# Patient Record
Sex: MALE | Race: OTHER | NOT HISPANIC OR LATINO | Employment: OTHER | URBAN - METROPOLITAN AREA
[De-identification: names, ages, dates, MRNs, and addresses within clinical notes are randomized per-mention and may not be internally consistent; named-entity substitution may affect disease eponyms.]

---

## 2018-01-24 ENCOUNTER — OFFICE VISIT (OUTPATIENT)
Dept: FAMILY MEDICINE CLINIC | Facility: CLINIC | Age: 48
End: 2018-01-24

## 2018-01-24 VITALS
RESPIRATION RATE: 16 BRPM | BODY MASS INDEX: 25.91 KG/M2 | SYSTOLIC BLOOD PRESSURE: 94 MMHG | DIASTOLIC BLOOD PRESSURE: 68 MMHG | HEIGHT: 68 IN | HEART RATE: 96 BPM | TEMPERATURE: 98.2 F | OXYGEN SATURATION: 98 % | WEIGHT: 171 LBS

## 2018-01-24 DIAGNOSIS — J06.9 ACUTE URI: ICD-10-CM

## 2018-01-24 DIAGNOSIS — Z76.89 ENCOUNTER TO ESTABLISH CARE: Primary | ICD-10-CM

## 2018-01-24 DIAGNOSIS — R52 BODY ACHES: ICD-10-CM

## 2018-01-24 DIAGNOSIS — J02.9 SORE THROAT: ICD-10-CM

## 2018-01-24 LAB
EXPIRATION DATE: NORMAL
EXPIRATION DATE: NORMAL
FLUAV AG NPH QL: NORMAL
FLUBV AG NPH QL: NORMAL
INTERNAL CONTROL: NORMAL
INTERNAL CONTROL: NORMAL
Lab: NORMAL
Lab: NORMAL
S PYO AG THROAT QL: NEGATIVE

## 2018-01-24 PROCEDURE — 87804 INFLUENZA ASSAY W/OPTIC: CPT | Performed by: PHYSICIAN ASSISTANT

## 2018-01-24 PROCEDURE — 87880 STREP A ASSAY W/OPTIC: CPT | Performed by: PHYSICIAN ASSISTANT

## 2018-01-24 PROCEDURE — 99213 OFFICE O/P EST LOW 20 MIN: CPT | Performed by: PHYSICIAN ASSISTANT

## 2018-01-24 RX ORDER — AZITHROMYCIN 250 MG/1
TABLET, FILM COATED ORAL
Qty: 6 TABLET | Refills: 0 | Status: SHIPPED | OUTPATIENT
Start: 2018-01-24 | End: 2018-07-30

## 2018-01-24 RX ORDER — BROMPHENIRAMINE MALEATE, PSEUDOEPHEDRINE HYDROCHLORIDE, AND DEXTROMETHORPHAN HYDROBROMIDE 2; 30; 10 MG/5ML; MG/5ML; MG/5ML
5 SYRUP ORAL 4 TIMES DAILY PRN
Qty: 118 ML | Refills: 0 | Status: SHIPPED | OUTPATIENT
Start: 2018-01-24 | End: 2018-07-30

## 2018-01-24 RX ORDER — METHYLPREDNISOLONE 4 MG/1
TABLET ORAL
Qty: 21 EACH | Refills: 0 | Status: SHIPPED | OUTPATIENT
Start: 2018-01-24 | End: 2018-07-30

## 2018-01-24 NOTE — PROGRESS NOTES
Shauna Shultz is a 48 y.o. male.     History of Present Illness   Pt presents with CC of sore throat, HA, feeling feverish (did not take temperature), chills, nasal congestion, cough, and body aches X 4 days. Recently traveled here from [place]. Has been taking Tylenol. Feels a little better today     The following portions of the patient's history were reviewed and updated as appropriate: allergies, current medications, past family history, past medical history, past social history, past surgical history and problem list.    Review of Systems   Constitutional: Positive for chills, fatigue and fever. Negative for diaphoresis.   HENT: Positive for congestion, postnasal drip and sore throat. Negative for ear discharge, ear pain, hearing loss, nosebleeds, sinus pressure and sneezing.    Eyes: Negative.    Respiratory: Positive for cough. Negative for chest tightness, shortness of breath and wheezing.    Cardiovascular: Negative.  Negative for chest pain, palpitations and leg swelling.   Gastrointestinal: Negative for abdominal distention, abdominal pain, anal bleeding, blood in stool, constipation, diarrhea, nausea, rectal pain and vomiting.   Endocrine: Negative.  Negative for cold intolerance, heat intolerance, polydipsia, polyphagia and polyuria.   Genitourinary: Negative.  Negative for difficulty urinating, dysuria, flank pain, frequency, hematuria and urgency.   Musculoskeletal: Positive for myalgias. Negative for arthralgias, back pain, gait problem, joint swelling, neck pain and neck stiffness.   Skin: Negative.  Negative for color change, pallor, rash and wound.   Allergic/Immunologic: Negative.  Negative for immunocompromised state.   Neurological: Positive for headaches. Negative for dizziness, syncope, weakness, light-headedness and numbness.   Hematological: Negative.  Negative for adenopathy. Does not bruise/bleed easily.   Psychiatric/Behavioral: Negative.  Negative for behavioral problems,  "confusion, self-injury, sleep disturbance and suicidal ideas. The patient is not nervous/anxious.        Objective    Blood pressure 94/68, pulse 96, temperature 98.2 °F (36.8 °C), temperature source Temporal Artery , resp. rate 16, height 172.7 cm (68\"), weight 77.6 kg (171 lb), SpO2 98 %.     Physical Exam   Constitutional: He is oriented to person, place, and time. He appears well-developed and well-nourished.   HENT:   Head: Normocephalic and atraumatic.   Right Ear: External ear and ear canal normal. Tympanic membrane is retracted. Tympanic membrane is not erythematous and not bulging.   Left Ear: External ear and ear canal normal. Tympanic membrane is retracted. Tympanic membrane is not erythematous and not bulging.   Nose: Mucosal edema and rhinorrhea present. Right sinus exhibits no maxillary sinus tenderness and no frontal sinus tenderness. Left sinus exhibits no maxillary sinus tenderness and no frontal sinus tenderness.   Mouth/Throat: Oropharynx is clear and moist and mucous membranes are normal. No oropharyngeal exudate, posterior oropharyngeal edema or posterior oropharyngeal erythema.   Eyes: Conjunctivae and EOM are normal. Pupils are equal, round, and reactive to light.   Neck: Normal range of motion. Neck supple. No tracheal deviation present. No thyromegaly present.   Cardiovascular: Normal rate, regular rhythm, normal heart sounds and intact distal pulses.  Exam reveals no gallop and no friction rub.    No murmur heard.  Pulmonary/Chest: Effort normal and breath sounds normal. No respiratory distress. He has no wheezes. He has no rales. He exhibits no tenderness.   Abdominal: Soft. Bowel sounds are normal. He exhibits no distension and no mass. There is no tenderness. There is no rebound and no guarding. No hernia.   Lymphadenopathy:     He has no cervical adenopathy.   Neurological: He is alert and oriented to person, place, and time.   Skin: Skin is warm and dry.   Psychiatric: He has a normal " mood and affect. His behavior is normal. Judgment and thought content normal.   Nursing note and vitals reviewed.      Assessment/Plan   Dragan was seen today for sore throat, fever, generalized body aches, nasal congestion, headache and labs only.    Diagnoses and all orders for this visit:    Encounter to establish care  -     POCT rapid strep A    Body aches  -     POC Influenza A / B  -     POCT rapid strep A    Sore throat  -     POCT rapid strep A    Acute URI  -     azithromycin (ZITHROMAX Z-MARY ELLEN) 250 MG tablet; Take 2 tablets the first day, then 1 tablet daily for 4 days.  -     MethylPREDNISolone (MEDROL, MARY ELLEN,) 4 MG tablet; Take as directed on package instructions.  -     brompheniramine-pseudoephedrine-DM 30-2-10 MG/5ML syrup; Take 5 mL by mouth 4 (Four) Times a Day As Needed for Congestion or Cough.      Flu and strep negative. Symptoms likely viral. Continue symptomatic treatment as discusses. May begin abx if no improvement or worsening in next 2-3 days. Pt agrees. F/U INB

## 2018-07-30 ENCOUNTER — OFFICE VISIT (OUTPATIENT)
Dept: FAMILY MEDICINE CLINIC | Facility: CLINIC | Age: 48
End: 2018-07-30

## 2018-07-30 VITALS
HEART RATE: 92 BPM | WEIGHT: 171.5 LBS | DIASTOLIC BLOOD PRESSURE: 70 MMHG | BODY MASS INDEX: 25.99 KG/M2 | SYSTOLIC BLOOD PRESSURE: 105 MMHG | RESPIRATION RATE: 16 BRPM | HEIGHT: 68 IN | TEMPERATURE: 98.1 F

## 2018-07-30 DIAGNOSIS — M25.562 PAIN IN BOTH KNEES, UNSPECIFIED CHRONICITY: ICD-10-CM

## 2018-07-30 DIAGNOSIS — M25.561 PAIN IN BOTH KNEES, UNSPECIFIED CHRONICITY: ICD-10-CM

## 2018-07-30 DIAGNOSIS — R73.03 PRE-DIABETES: ICD-10-CM

## 2018-07-30 DIAGNOSIS — E55.9 VITAMIN D DEFICIENCY: ICD-10-CM

## 2018-07-30 DIAGNOSIS — R07.89 OTHER CHEST PAIN: ICD-10-CM

## 2018-07-30 DIAGNOSIS — R09.81 SINUS CONGESTION: Primary | ICD-10-CM

## 2018-07-30 DIAGNOSIS — E78.5 HYPERLIPIDEMIA, UNSPECIFIED HYPERLIPIDEMIA TYPE: ICD-10-CM

## 2018-07-30 PROCEDURE — 99214 OFFICE O/P EST MOD 30 MIN: CPT | Performed by: PHYSICIAN ASSISTANT

## 2018-07-30 RX ORDER — LORATADINE 10 MG/1
10 TABLET ORAL DAILY
Qty: 30 TABLET | Refills: 3 | Status: SHIPPED | OUTPATIENT
Start: 2018-07-30 | End: 2018-12-17

## 2018-07-30 RX ORDER — BROMPHENIRAMINE MALEATE, PSEUDOEPHEDRINE HYDROCHLORIDE, AND DEXTROMETHORPHAN HYDROBROMIDE 2; 30; 10 MG/5ML; MG/5ML; MG/5ML
5 SYRUP ORAL 4 TIMES DAILY PRN
Qty: 118 ML | Refills: 2 | Status: SHIPPED | OUTPATIENT
Start: 2018-07-30 | End: 2018-12-17

## 2018-07-30 RX ORDER — FLUTICASONE PROPIONATE 50 MCG
2 SPRAY, SUSPENSION (ML) NASAL DAILY
Qty: 1 BOTTLE | Refills: 3 | Status: SHIPPED | OUTPATIENT
Start: 2018-07-30 | End: 2018-12-17

## 2018-07-30 NOTE — PROGRESS NOTES
Subjective   Dragan Shultz is a 48 y.o. male.     History of Present Illness   Pt presents for f/u. Requests lab work. Has been told in [place] that his blood sugar and cholesterol have been elevated in the past.   Been two years since last stress test. Everything normal at that time, but requests to have it checked again. Does have occasional left sided chest pain   Since moving to [place] in the spring, had a lot of sinus congestion, drainage and cough. Better now however   Bilateral knee pain if he has been on them all day. L>R. Family tries to tell him to rest. Works in [employer] industry so has to get up during the night. Legs will be painful until he stands on them for awhile. No injury or crepitus.     The following portions of the patient's history were reviewed and updated as appropriate: allergies, current medications, past family history, past medical history, past social history, past surgical history and problem list.    Review of Systems   Constitutional: Negative.  Negative for chills, diaphoresis, fatigue and fever.   HENT: Positive for congestion. Negative for ear discharge, ear pain, hearing loss, nosebleeds, postnasal drip, sinus pressure, sneezing and sore throat.    Eyes: Negative.    Respiratory: Negative.  Negative for cough, chest tightness, shortness of breath and wheezing.    Cardiovascular: Negative for palpitations and leg swelling.        Refer to HPI    Gastrointestinal: Negative for abdominal distention, abdominal pain, anal bleeding, blood in stool, constipation, diarrhea, nausea, rectal pain and vomiting.   Endocrine: Negative.  Negative for cold intolerance, heat intolerance, polydipsia, polyphagia and polyuria.   Genitourinary: Negative.  Negative for difficulty urinating, dysuria, flank pain, frequency, hematuria and urgency.   Musculoskeletal: Positive for arthralgias. Negative for back pain, gait problem, joint swelling, myalgias, neck pain and neck stiffness.   Skin: Negative.   Negative for color change, pallor, rash and wound.   Allergic/Immunologic: Positive for environmental allergies. Negative for immunocompromised state.   Neurological: Negative for dizziness, syncope, weakness, light-headedness, numbness and headaches.   Hematological: Negative.  Negative for adenopathy. Does not bruise/bleed easily.   Psychiatric/Behavioral: Negative.  Negative for behavioral problems, confusion, self-injury, sleep disturbance and suicidal ideas. The patient is not nervous/anxious.        Objective   Physical Exam   Constitutional: He is oriented to person, place, and time. He appears well-developed and well-nourished.   HENT:   Head: Normocephalic and atraumatic.   Right Ear: Tympanic membrane, external ear and ear canal normal.   Left Ear: Tympanic membrane, external ear and ear canal normal.   Nose: Nose normal.   Mouth/Throat: Oropharynx is clear and moist. No oropharyngeal exudate.   Eyes: Pupils are equal, round, and reactive to light. Conjunctivae and EOM are normal.   Neck: Normal range of motion. Neck supple. No tracheal deviation present. No thyromegaly present.   Cardiovascular: Normal rate, regular rhythm, normal heart sounds and intact distal pulses.  Exam reveals no gallop and no friction rub.    No murmur heard.  Pulmonary/Chest: Effort normal and breath sounds normal. No respiratory distress. He has no wheezes. He has no rales. He exhibits no tenderness.   Abdominal: Soft. Bowel sounds are normal. He exhibits no distension and no mass. There is no tenderness. There is no rebound and no guarding. No hernia.   Musculoskeletal: Normal range of motion. He exhibits no edema, tenderness or deformity.   Lymphadenopathy:     He has no cervical adenopathy.   Neurological: He is alert and oriented to person, place, and time. He has normal reflexes.   Skin: Skin is warm and dry.   Psychiatric: He has a normal mood and affect. His behavior is normal. Judgment and thought content normal.    Nursing note and vitals reviewed.      Assessment/Plan   Dragan was seen today for pt desires lab work.    Diagnoses and all orders for this visit:    Sinus congestion  -     fluticasone (FLONASE) 50 MCG/ACT nasal spray; 2 sprays into the nostril(s) as directed by provider Daily.  -     loratadine (CLARITIN) 10 MG tablet; Take 1 tablet by mouth Daily.  -     brompheniramine-pseudoephedrine-DM 30-2-10 MG/5ML syrup; Take 5 mL by mouth 4 (Four) Times a Day As Needed for Congestion, Cough or Allergies.    Pain in both knees, unspecified chronicity  -     Sedimentation rate, automated  -     C-reactive protein  -     Rheumatoid Factor, Quant    Hyperlipidemia, unspecified hyperlipidemia type  -     CBC w AUTO Differential  -     Comprehensive metabolic panel  -     Lipid panel    Pre-diabetes  -     CBC w AUTO Differential  -     Comprehensive metabolic panel  -     Hemoglobin A1c    Vitamin D deficiency  -     Vitamin D 25 hydroxy    Other chest pain  -     Ambulatory Referral to Cardiology    labs as outlined in plan. Referral to cardiology for requested stress testing. No current symptoms   Allergy and congestion medication for when symptoms act up   F/u pending labs.

## 2018-07-31 LAB
25(OH)D3+25(OH)D2 SERPL-MCNC: 31.5 NG/ML
ALBUMIN SERPL-MCNC: 4.45 G/DL (ref 3.2–4.8)
ALBUMIN/GLOB SERPL: 1.7 G/DL (ref 1.5–2.5)
ALP SERPL-CCNC: 49 U/L (ref 25–100)
ALT SERPL-CCNC: 19 U/L (ref 7–40)
AST SERPL-CCNC: 15 U/L (ref 0–33)
BASOPHILS # BLD AUTO: 0.02 10*3/MM3 (ref 0–0.2)
BASOPHILS NFR BLD AUTO: 0.4 % (ref 0–1)
BILIRUB SERPL-MCNC: 0.5 MG/DL (ref 0.3–1.2)
BUN SERPL-MCNC: 10 MG/DL (ref 9–23)
BUN/CREAT SERPL: 10.9 (ref 7–25)
CALCIUM SERPL-MCNC: 9.5 MG/DL (ref 8.7–10.4)
CHLORIDE SERPL-SCNC: 107 MMOL/L (ref 99–109)
CHOLEST SERPL-MCNC: 254 MG/DL (ref 0–200)
CO2 SERPL-SCNC: 28 MMOL/L (ref 20–31)
CREAT SERPL-MCNC: 0.92 MG/DL (ref 0.6–1.3)
CRP SERPL-MCNC: 0.22 MG/DL (ref 0–1)
EOSINOPHIL # BLD AUTO: 0.27 10*3/MM3 (ref 0–0.3)
EOSINOPHIL NFR BLD AUTO: 5.5 % (ref 0–3)
ERYTHROCYTE [DISTWIDTH] IN BLOOD BY AUTOMATED COUNT: 14.5 % (ref 11.3–14.5)
ERYTHROCYTE [SEDIMENTATION RATE] IN BLOOD BY WESTERGREN METHOD: 4 MM/HR (ref 0–15)
GLOBULIN SER CALC-MCNC: 2.7 GM/DL
GLUCOSE SERPL-MCNC: 94 MG/DL (ref 70–100)
HBA1C MFR BLD: 6.4 % (ref 4.8–5.6)
HCT VFR BLD AUTO: 49 % (ref 38.9–50.9)
HDLC SERPL-MCNC: 45 MG/DL (ref 40–60)
HGB BLD-MCNC: 15.2 G/DL (ref 13.1–17.5)
IMM GRANULOCYTES # BLD: 0.04 10*3/MM3 (ref 0–0.03)
IMM GRANULOCYTES NFR BLD: 0.8 % (ref 0–0.6)
LDLC SERPL CALC-MCNC: 151 MG/DL (ref 0–100)
LYMPHOCYTES # BLD AUTO: 2.23 10*3/MM3 (ref 0.6–4.8)
LYMPHOCYTES NFR BLD AUTO: 45.1 % (ref 24–44)
MCH RBC QN AUTO: 27.7 PG (ref 27–31)
MCHC RBC AUTO-ENTMCNC: 31 G/DL (ref 32–36)
MCV RBC AUTO: 89.4 FL (ref 80–99)
MONOCYTES # BLD AUTO: 0.51 10*3/MM3 (ref 0–1)
MONOCYTES NFR BLD AUTO: 10.3 % (ref 0–12)
NEUTROPHILS # BLD AUTO: 1.88 10*3/MM3 (ref 1.5–8.3)
NEUTROPHILS NFR BLD AUTO: 37.9 % (ref 41–71)
PLATELET # BLD AUTO: 167 10*3/MM3 (ref 150–450)
POTASSIUM SERPL-SCNC: 4.7 MMOL/L (ref 3.5–5.5)
PROT SERPL-MCNC: 7.1 G/DL (ref 5.7–8.2)
RBC # BLD AUTO: 5.48 10*6/MM3 (ref 4.2–5.76)
RHEUMATOID FACT SERPL-ACNC: <10 IU/ML (ref 0–13.9)
SODIUM SERPL-SCNC: 144 MMOL/L (ref 132–146)
TRIGL SERPL-MCNC: 290 MG/DL (ref 0–150)
VLDLC SERPL CALC-MCNC: 58 MG/DL
WBC # BLD AUTO: 4.95 10*3/MM3 (ref 3.5–10.8)

## 2018-12-17 ENCOUNTER — OFFICE VISIT (OUTPATIENT)
Dept: FAMILY MEDICINE CLINIC | Facility: CLINIC | Age: 48
End: 2018-12-17

## 2018-12-17 ENCOUNTER — HOSPITAL ENCOUNTER (OUTPATIENT)
Dept: GENERAL RADIOLOGY | Facility: HOSPITAL | Age: 48
Discharge: HOME | End: 2018-12-17
Admitting: PHYSICIAN ASSISTANT

## 2018-12-17 VITALS
WEIGHT: 164 LBS | BODY MASS INDEX: 24.94 KG/M2 | RESPIRATION RATE: 18 BRPM | HEART RATE: 68 BPM | DIASTOLIC BLOOD PRESSURE: 78 MMHG | SYSTOLIC BLOOD PRESSURE: 112 MMHG | TEMPERATURE: 97.2 F

## 2018-12-17 DIAGNOSIS — R05.9 COUGH: ICD-10-CM

## 2018-12-17 DIAGNOSIS — R73.9 ELEVATED BLOOD SUGAR: Primary | ICD-10-CM

## 2018-12-17 DIAGNOSIS — E78.00 ELEVATED CHOLESTEROL: ICD-10-CM

## 2018-12-17 PROCEDURE — 71046 X-RAY EXAM CHEST 2 VIEWS: CPT

## 2018-12-17 PROCEDURE — 99213 OFFICE O/P EST LOW 20 MIN: CPT | Performed by: PHYSICIAN ASSISTANT

## 2018-12-17 RX ORDER — MONTELUKAST SODIUM 10 MG/1
10 TABLET ORAL NIGHTLY
Qty: 30 TABLET | Refills: 5 | Status: SHIPPED | OUTPATIENT
Start: 2018-12-17 | End: 2019-05-30

## 2018-12-17 NOTE — PROGRESS NOTES
Subjective   Dragan Shultz is a 48 y.o. male.     History of Present Illness   F/u for elevated blood sugar and cholesterol. Fasting for labs.   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 Some mucus production. Allergy medication did not help. Denies heartburn or reflux. Hard for him to get to sleep due to cough. Denies snoring   Did not do cardiology referral. Denies any additional chest pain since last visit     The following portions of the patient's history were reviewed and updated as appropriate: allergies, current medications, past family history, past medical history, past social history, past surgical history and problem list.    Review of Systems   Constitutional: Negative.  Negative for chills, diaphoresis, fatigue and fever.   HENT: Negative.  Negative for congestion, ear discharge, ear pain, hearing loss, nosebleeds, postnasal drip, sinus pressure, sneezing and sore throat.    Eyes: Negative.    Respiratory: Positive for cough. Negative for chest tightness, shortness of breath and wheezing.    Cardiovascular: Negative.  Negative for chest pain, palpitations and leg swelling.   Gastrointestinal: Negative for abdominal distention, abdominal pain, anal bleeding, blood in stool, constipation, diarrhea, nausea, rectal pain and vomiting.   Endocrine: Negative.  Negative for cold intolerance, heat intolerance, polydipsia, polyphagia and polyuria.   Genitourinary: Negative.  Negative for difficulty urinating, dysuria, flank pain, frequency, hematuria and urgency.   Musculoskeletal: Negative.  Negative for arthralgias, back pain, gait problem, joint swelling, myalgias, neck pain and neck stiffness.   Skin: Negative.  Negative for color change, pallor, rash and wound.   Allergic/Immunologic: Negative.  Negative for immunocompromised state.   Neurological: Negative for  dizziness, syncope, weakness, light-headedness, numbness and headaches.   Hematological: Negative.  Negative for adenopathy. Does not bruise/bleed easily.   Psychiatric/Behavioral: Negative.  Negative for behavioral problems, confusion, self-injury, sleep disturbance and suicidal ideas. The patient is not nervous/anxious.        Objective    Blood pressure 112/78, pulse 68, temperature 97.2 °F (36.2 °C), temperature source Temporal, resp. rate 18, weight 74.4 kg (164 lb).     Physical Exam   Constitutional: He is oriented to person, place, and time. He appears well-developed and well-nourished.   HENT:   Head: Normocephalic and atraumatic.   Right Ear: External ear normal.   Left Ear: External ear normal.   Nose: Nose normal.   Mouth/Throat: Oropharynx is clear and moist. No oropharyngeal exudate.   Eyes: Conjunctivae and EOM are normal. Pupils are equal, round, and reactive to light.   Neck: Normal range of motion. Neck supple. No tracheal deviation present. No thyromegaly present.   Cardiovascular: Normal rate, regular rhythm, normal heart sounds and intact distal pulses. Exam reveals no gallop and no friction rub.   No murmur heard.  Pulmonary/Chest: Effort normal and breath sounds normal. No respiratory distress. He has no wheezes. He has no rales. He exhibits no tenderness.   Abdominal: Soft. Bowel sounds are normal. He exhibits no distension and no mass. There is no tenderness. There is no rebound and no guarding. No hernia.   Lymphadenopathy:     He has no cervical adenopathy.   Neurological: He is alert and oriented to person, place, and time. He has normal reflexes.   Skin: Skin is warm and dry.   Psychiatric: He has a normal mood and affect. His behavior is normal. Judgment and thought content normal.   Nursing note and vitals reviewed.      Assessment/Plan   Dragan was seen today for follow-up.    Diagnoses and all orders for this visit:    Elevated blood sugar  -     CBC & Differential  -      Comprehensive Metabolic Panel  -     Hemoglobin A1c    Elevated cholesterol  -     Lipid Panel    Cough  -     XR Chest PA & Lateral  -     montelukast (SINGULAIR) 10 MG tablet; Take 1 tablet by mouth Every Night.    labs as outlined in plan. Will do chest XR for persistent cough and do trial of singulair as noted

## 2018-12-18 LAB
ALBUMIN SERPL-MCNC: 4.49 G/DL (ref 3.2–4.8)
ALBUMIN/GLOB SERPL: 1.9 G/DL (ref 1.5–2.5)
ALP SERPL-CCNC: 48 U/L (ref 25–100)
ALT SERPL-CCNC: 25 U/L (ref 7–40)
AST SERPL-CCNC: 17 U/L (ref 0–33)
BASOPHILS # BLD AUTO: 0.02 10*3/MM3 (ref 0–0.2)
BASOPHILS NFR BLD AUTO: 0.4 % (ref 0–1)
BILIRUB SERPL-MCNC: 0.6 MG/DL (ref 0.3–1.2)
BUN SERPL-MCNC: 12 MG/DL (ref 9–23)
BUN/CREAT SERPL: 12.5 (ref 7–25)
CALCIUM SERPL-MCNC: 9.3 MG/DL (ref 8.7–10.4)
CHLORIDE SERPL-SCNC: 106 MMOL/L (ref 99–109)
CHOLEST SERPL-MCNC: 264 MG/DL (ref 0–200)
CO2 SERPL-SCNC: 26 MMOL/L (ref 20–31)
CREAT SERPL-MCNC: 0.96 MG/DL (ref 0.6–1.3)
EOSINOPHIL # BLD AUTO: 0.43 10*3/MM3 (ref 0–0.3)
EOSINOPHIL NFR BLD AUTO: 8.7 % (ref 0–3)
ERYTHROCYTE [DISTWIDTH] IN BLOOD BY AUTOMATED COUNT: 14.4 % (ref 11.3–14.5)
GLOBULIN SER CALC-MCNC: 2.3 GM/DL
GLUCOSE SERPL-MCNC: 109 MG/DL (ref 70–100)
HBA1C MFR BLD: 6.2 % (ref 4.8–5.6)
HCT VFR BLD AUTO: 48.7 % (ref 38.9–50.9)
HDLC SERPL-MCNC: 49 MG/DL (ref 40–60)
HGB BLD-MCNC: 15.4 G/DL (ref 13.1–17.5)
IMM GRANULOCYTES # BLD: 0.02 10*3/MM3 (ref 0–0.03)
IMM GRANULOCYTES NFR BLD: 0.4 % (ref 0–0.6)
LDLC SERPL CALC-MCNC: 176 MG/DL (ref 0–100)
LYMPHOCYTES # BLD AUTO: 2.36 10*3/MM3 (ref 0.6–4.8)
LYMPHOCYTES NFR BLD AUTO: 47.6 % (ref 24–44)
MCH RBC QN AUTO: 27.6 PG (ref 27–31)
MCHC RBC AUTO-ENTMCNC: 31.6 G/DL (ref 32–36)
MCV RBC AUTO: 87.3 FL (ref 80–99)
MONOCYTES # BLD AUTO: 0.45 10*3/MM3 (ref 0–1)
MONOCYTES NFR BLD AUTO: 9.1 % (ref 0–12)
NEUTROPHILS # BLD AUTO: 1.68 10*3/MM3 (ref 1.5–8.3)
NEUTROPHILS NFR BLD AUTO: 33.8 % (ref 41–71)
PLATELET # BLD AUTO: 199 10*3/MM3 (ref 150–450)
POTASSIUM SERPL-SCNC: 5 MMOL/L (ref 3.5–5.5)
PROT SERPL-MCNC: 6.8 G/DL (ref 5.7–8.2)
RBC # BLD AUTO: 5.58 10*6/MM3 (ref 4.2–5.76)
SODIUM SERPL-SCNC: 139 MMOL/L (ref 132–146)
TRIGL SERPL-MCNC: 197 MG/DL (ref 0–150)
VLDLC SERPL CALC-MCNC: 39.4 MG/DL
WBC # BLD AUTO: 4.96 10*3/MM3 (ref 3.5–10.8)

## 2019-04-15 ENCOUNTER — TELEPHONE (OUTPATIENT)
Dept: FAMILY MEDICINE CLINIC | Facility: CLINIC | Age: 49
End: 2019-04-15

## 2019-04-15 NOTE — TELEPHONE ENCOUNTER
----- Message from Valentine Hoyos sent at 4/15/2019  8:07 AM EDT -----  Contact: MAURICIO;SON CALLED-RADHA  IS THE PT DUE LABS WORK AT THIS TIME?     [phone]

## 2019-04-16 ENCOUNTER — OFFICE VISIT (OUTPATIENT)
Dept: FAMILY MEDICINE CLINIC | Facility: CLINIC | Age: 49
End: 2019-04-16

## 2019-04-16 ENCOUNTER — HOSPITAL ENCOUNTER (OUTPATIENT)
Dept: GENERAL RADIOLOGY | Facility: HOSPITAL | Age: 49
Discharge: HOME | End: 2019-04-16
Admitting: PHYSICIAN ASSISTANT

## 2019-04-16 VITALS
RESPIRATION RATE: 18 BRPM | BODY MASS INDEX: 26.07 KG/M2 | SYSTOLIC BLOOD PRESSURE: 108 MMHG | WEIGHT: 172 LBS | HEART RATE: 84 BPM | TEMPERATURE: 97.6 F | DIASTOLIC BLOOD PRESSURE: 74 MMHG | HEIGHT: 68 IN

## 2019-04-16 DIAGNOSIS — E55.9 VITAMIN D DEFICIENCY: ICD-10-CM

## 2019-04-16 DIAGNOSIS — R53.82 CHRONIC FATIGUE: ICD-10-CM

## 2019-04-16 DIAGNOSIS — R73.03 PRE-DIABETES: Primary | ICD-10-CM

## 2019-04-16 DIAGNOSIS — M25.531 RIGHT WRIST PAIN: ICD-10-CM

## 2019-04-16 DIAGNOSIS — M25.531 ARTHRALGIA OF RIGHT WRIST: ICD-10-CM

## 2019-04-16 DIAGNOSIS — E53.8 VITAMIN B12 DEFICIENCY: ICD-10-CM

## 2019-04-16 PROCEDURE — 73110 X-RAY EXAM OF WRIST: CPT

## 2019-04-16 PROCEDURE — 99214 OFFICE O/P EST MOD 30 MIN: CPT | Performed by: PHYSICIAN ASSISTANT

## 2019-04-16 RX ORDER — MELOXICAM 7.5 MG/1
7.5-15 TABLET ORAL DAILY
Qty: 30 TABLET | Refills: 0 | Status: SHIPPED | OUTPATIENT
Start: 2019-04-16 | End: 2019-05-30

## 2019-04-16 NOTE — PROGRESS NOTES
"Shauna Shultz is a 49 y.o. male.     History of Present Illness   F/U for elevated blood sugar. Due for labs. 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 Has in knees (chronic). Also has been having R wrist pain (dorsal) off and on for over a year. Usually worse by driving position, or repetitive motions such as twisting/ turning, bending. No elbow or forearm pain. No trauma to hand. No numbness or tingling. No pain in fingers. No swelling   Pt is right hand dominant     The following portions of the patient's history were reviewed and updated as appropriate: allergies, current medications, past family history, past medical history, past social history, past surgical history and problem list.    Review of Systems   Constitutional: Positive for fatigue. Negative for chills, diaphoresis and fever.   HENT: Negative.  Negative for congestion, ear discharge, ear pain, hearing loss, nosebleeds, postnasal drip, sinus pressure, sneezing and sore throat.    Eyes: Negative.    Respiratory: Negative.  Negative for cough, chest tightness, shortness of breath and wheezing.    Cardiovascular: Negative.  Negative for chest pain, palpitations and leg swelling.   Gastrointestinal: Negative for abdominal distention, abdominal pain, blood in stool, constipation, diarrhea, nausea and vomiting.   Genitourinary: Negative.  Negative for difficulty urinating, dysuria, flank pain, frequency, hematuria and urgency.   Musculoskeletal: Positive for arthralgias. Negative for back pain, gait problem, joint swelling, myalgias, neck pain and neck stiffness.   Skin: Negative.  Negative for color change, pallor, rash and wound.   Neurological: Negative for dizziness, syncope, weakness, light-headedness, numbness and headaches.       Objective    Blood pressure 108/74, pulse 84, temperature 97.6 °F (36.4 °C), resp. rate 18, height 172.7 cm (68\"), weight 78 " kg (172 lb).     Physical Exam   Constitutional: He is oriented to person, place, and time. He appears well-developed and well-nourished.   HENT:   Head: Normocephalic and atraumatic.   Right Ear: External ear normal.   Left Ear: External ear normal.   Nose: Nose normal.   Mouth/Throat: Oropharynx is clear and moist. No oropharyngeal exudate.   Eyes: Conjunctivae are normal.   Neck: Normal range of motion. Neck supple. No tracheal deviation present. No thyromegaly present.   Cardiovascular: Normal rate, regular rhythm, normal heart sounds and intact distal pulses. Exam reveals no gallop and no friction rub.   No murmur heard.  Pulmonary/Chest: Effort normal and breath sounds normal. No respiratory distress. He has no wheezes. He has no rales. He exhibits no tenderness.   Abdominal: Soft. Bowel sounds are normal. He exhibits no distension and no mass. There is no tenderness. There is no rebound and no guarding. No hernia.   Musculoskeletal: Normal range of motion. He exhibits no edema or deformity.        Right wrist: He exhibits tenderness and bony tenderness. He exhibits normal range of motion, no swelling, no effusion, no crepitus, no deformity and no laceration.   Lymphadenopathy:     He has no cervical adenopathy.   Neurological: He is alert and oriented to person, place, and time.   Skin: Skin is warm and dry.   Psychiatric: He has a normal mood and affect. His behavior is normal. Judgment and thought content normal.   Nursing note and vitals reviewed.      Assessment/Plan   Dragan was seen today for wrist pain.    Diagnoses and all orders for this visit:    Pre-diabetes  -     CBC & Differential  -     Comprehensive Metabolic Panel  -     Lipid Panel  -     Hemoglobin A1c    Right wrist pain  -     XR Wrist 3+ View Right  -     meloxicam (MOBIC) 7.5 MG tablet; Take 1-2 tablets by mouth Daily.    Vitamin D deficiency  -     Vitamin D 25 Hydroxy    Vitamin B12 deficiency  -     Vitamin B12    Chronic  fatigue  -     Iron Profile    Arthralgia of right wrist  -     Uric acid  -     RHEUMATOID FACTOR  -     C-reactive Protein  -     Sedimentation rate, automated    labs as outlined in plan. Will proceed with XR of wrist. Suspect tendonitis. NSAID and rest as directed. Discussed brace can also help to allow restricted movement and rest.

## 2019-04-17 LAB
25(OH)D3+25(OH)D2 SERPL-MCNC: 20.7 NG/ML (ref 30–100)
ALBUMIN SERPL-MCNC: 4.8 G/DL (ref 3.5–5.2)
ALBUMIN/GLOB SERPL: 1.7 G/DL
ALP SERPL-CCNC: 55 U/L (ref 39–117)
ALT SERPL-CCNC: 19 U/L (ref 1–41)
AST SERPL-CCNC: 15 U/L (ref 1–40)
BASOPHILS # BLD AUTO: 0.06 10*3/MM3 (ref 0–0.2)
BASOPHILS NFR BLD AUTO: 1.1 % (ref 0–1.5)
BILIRUB SERPL-MCNC: 0.4 MG/DL (ref 0.2–1.2)
BUN SERPL-MCNC: 8 MG/DL (ref 6–20)
BUN/CREAT SERPL: 8.8 (ref 7–25)
CALCIUM SERPL-MCNC: 9.8 MG/DL (ref 8.6–10.5)
CHLORIDE SERPL-SCNC: 103 MMOL/L (ref 98–107)
CHOLEST SERPL-MCNC: 284 MG/DL (ref 0–200)
CO2 SERPL-SCNC: 28 MMOL/L (ref 22–29)
CREAT SERPL-MCNC: 0.91 MG/DL (ref 0.76–1.27)
CRP SERPL-MCNC: 0.16 MG/DL (ref 0–0.5)
EOSINOPHIL # BLD AUTO: 0.63 10*3/MM3 (ref 0–0.4)
EOSINOPHIL NFR BLD AUTO: 11.3 % (ref 0.3–6.2)
ERYTHROCYTE [DISTWIDTH] IN BLOOD BY AUTOMATED COUNT: 14.5 % (ref 12.3–15.4)
ERYTHROCYTE [SEDIMENTATION RATE] IN BLOOD BY WESTERGREN METHOD: 1 MM/HR (ref 0–15)
GLOBULIN SER CALC-MCNC: 2.8 GM/DL
GLUCOSE SERPL-MCNC: 122 MG/DL (ref 65–99)
HBA1C MFR BLD: 6.17 % (ref 4.8–5.6)
HCT VFR BLD AUTO: 50.7 % (ref 37.5–51)
HDLC SERPL-MCNC: 42 MG/DL (ref 40–60)
HGB BLD-MCNC: 15.5 G/DL (ref 13–17.7)
IMM GRANULOCYTES # BLD AUTO: 0.04 10*3/MM3 (ref 0–0.05)
IMM GRANULOCYTES NFR BLD AUTO: 0.7 % (ref 0–0.5)
IRON SATN MFR SERPL: 34 % (ref 20–50)
IRON SERPL-MCNC: 120 MCG/DL (ref 59–158)
LDLC SERPL CALC-MCNC: 176 MG/DL (ref 0–100)
LYMPHOCYTES # BLD AUTO: 2.22 10*3/MM3 (ref 0.7–3.1)
LYMPHOCYTES NFR BLD AUTO: 39.7 % (ref 19.6–45.3)
MCH RBC QN AUTO: 27.5 PG (ref 26.6–33)
MCHC RBC AUTO-ENTMCNC: 30.6 G/DL (ref 31.5–35.7)
MCV RBC AUTO: 90.1 FL (ref 79–97)
MONOCYTES # BLD AUTO: 0.57 10*3/MM3 (ref 0.1–0.9)
MONOCYTES NFR BLD AUTO: 10.2 % (ref 5–12)
NEUTROPHILS # BLD AUTO: 2.07 10*3/MM3 (ref 1.4–7)
NEUTROPHILS NFR BLD AUTO: 37 % (ref 42.7–76)
NRBC BLD AUTO-RTO: 0 /100 WBC (ref 0–0)
PLATELET # BLD AUTO: 181 10*3/MM3 (ref 140–450)
POTASSIUM SERPL-SCNC: 4.4 MMOL/L (ref 3.5–5.2)
PROT SERPL-MCNC: 7.6 G/DL (ref 6–8.5)
RBC # BLD AUTO: 5.63 10*6/MM3 (ref 4.14–5.8)
RHEUMATOID FACT SERPL-ACNC: <10 IU/ML (ref 0–13.9)
SODIUM SERPL-SCNC: 140 MMOL/L (ref 136–145)
TIBC SERPL-MCNC: 355 MCG/DL
TRIGL SERPL-MCNC: 332 MG/DL (ref 0–150)
UIBC SERPL-MCNC: 235 MCG/DL
URATE SERPL-MCNC: 6.9 MG/DL (ref 3.4–7)
VIT B12 SERPL-MCNC: 234 PG/ML (ref 211–946)
VLDLC SERPL CALC-MCNC: 66.4 MG/DL
WBC # BLD AUTO: 5.59 10*3/MM3 (ref 3.4–10.8)

## 2019-04-24 ENCOUNTER — TELEPHONE (OUTPATIENT)
Dept: FAMILY MEDICINE CLINIC | Facility: CLINIC | Age: 49
End: 2019-04-24

## 2019-04-24 DIAGNOSIS — E78.2 MIXED HYPERLIPIDEMIA: Primary | ICD-10-CM

## 2019-04-24 NOTE — TELEPHONE ENCOUNTER
----- Message from Valentine Hoyos sent at 4/24/2019  3:57 PM EDT -----  Contact: MAURICIO; PT WAS HERE  DAUGHTER STATES THAT PATIENT DOES WANT TO GET STARTED ON THE  CHOLESTEROL MED    KRISHNA KUMAR    [phone]

## 2019-04-25 ENCOUNTER — TELEPHONE (OUTPATIENT)
Dept: FAMILY MEDICINE CLINIC | Facility: CLINIC | Age: 49
End: 2019-04-25

## 2019-04-25 DIAGNOSIS — E78.2 MIXED HYPERLIPIDEMIA: ICD-10-CM

## 2019-04-25 RX ORDER — ROSUVASTATIN CALCIUM 10 MG/1
10 TABLET, COATED ORAL DAILY
Qty: 30 TABLET | Refills: 2 | Status: SHIPPED | OUTPATIENT
Start: 2019-04-25 | End: 2019-04-25 | Stop reason: SDUPTHER

## 2019-04-25 RX ORDER — SIMVASTATIN 10 MG
10 TABLET ORAL NIGHTLY
Qty: 30 TABLET | Refills: 2 | Status: SHIPPED | OUTPATIENT
Start: 2019-04-25 | End: 2020-06-24

## 2019-04-25 RX ORDER — ROSUVASTATIN CALCIUM 10 MG/1
10 TABLET, COATED ORAL DAILY
Qty: 30 TABLET | Refills: 2 | Status: SHIPPED | OUTPATIENT
Start: 2019-04-25 | End: 2019-04-25

## 2019-04-25 NOTE — TELEPHONE ENCOUNTER
----- Message from Shaw Del Rio sent at 4/25/2019  8:50 AM EDT -----  Contact: MAURICIO / PT CALL  PLEASE RE- SEND THE FOLLOWING RX TO [facility] - [address] [facility] [phone].  PLEASE MAKE A PERMENANT CHANGE.  PLEASE TAKE OUT LINDSEY.      rosuvastatin (CRESTOR) 10 MG tablet [[id number]]   Order Details   Dose: 10 mg Route: Oral Frequency: Daily  Dispense Quantity: 30 tablet Refills: 2 Fills remaining: --         Sig: Take 1 tablet by mouth Daily.        Written Date: 04/25/19 Expiration Date: 04/24/20    Start Date: 04/25/19 End Date: --          Ordering Provider:  Mauricio Martinez PA Phone:  [phone] Fax:  [phone]   Address:  [address] [place] [address] [place] [address] NPI:  [id number]          Authorizing Provider:  Mauricio Martinez PA Phone:  [phone] Fax:  [phone]   [facility]  [facility] [place] [id number] NPI:  [id number]          Ordering User:  Mauricio Martinez PA          Diagnosis Association: Mixed hyperlipidemia (E78.2)    Pharmacy:  [id number] [place], [place] - [address] [place] [address] AT [facility] - [phone]  - [phone]  Phone:  [phone] Fax:  [phone]   Address:  [address] [place], [place] [address] [address]

## 2019-04-25 NOTE — TELEPHONE ENCOUNTER
Please cancel fill at [facility], change patients pharmacy to the one requests and call and give verbal Rx to pharmacy of the [facility]. FAMILIA

## 2019-04-25 NOTE — TELEPHONE ENCOUNTER
----- Message from Tammy Verde sent at 4/25/2019  2:16 PM EDT -----  Contact: peña, patient son  The cholesterol medication we prescribed for the patient is not covered by his insurance,can we prescribe something else?     [facility]     [phone] may leave a message

## 2019-05-30 ENCOUNTER — OFFICE VISIT (OUTPATIENT)
Dept: FAMILY MEDICINE CLINIC | Facility: CLINIC | Age: 49
End: 2019-05-30

## 2019-05-30 VITALS
HEART RATE: 76 BPM | TEMPERATURE: 97.2 F | SYSTOLIC BLOOD PRESSURE: 104 MMHG | WEIGHT: 167 LBS | HEIGHT: 68 IN | BODY MASS INDEX: 25.31 KG/M2 | RESPIRATION RATE: 18 BRPM | DIASTOLIC BLOOD PRESSURE: 68 MMHG

## 2019-05-30 DIAGNOSIS — R05.3 CHRONIC COUGH: ICD-10-CM

## 2019-05-30 DIAGNOSIS — M25.512 ACUTE PAIN OF LEFT SHOULDER: ICD-10-CM

## 2019-05-30 DIAGNOSIS — R06.02 SOB (SHORTNESS OF BREATH): ICD-10-CM

## 2019-05-30 DIAGNOSIS — R06.2 WHEEZING: Primary | ICD-10-CM

## 2019-05-30 PROCEDURE — 99214 OFFICE O/P EST MOD 30 MIN: CPT | Performed by: PHYSICIAN ASSISTANT

## 2019-05-30 RX ORDER — CETIRIZINE HYDROCHLORIDE 10 MG/1
10 TABLET ORAL DAILY
COMMUNITY
End: 2020-06-24

## 2019-05-30 NOTE — PROGRESS NOTES
Subjective   Dragan Shultz is a 49 y.o. male.   Chief Complaint   Patient presents with   • Shortness of Breath     went to  [facility] a week and half ago for difficulty breathing. They thought he may have asthma. Got a breathing treatment that helped.  Had CXR and EKG they were normal.       History of Present Illness   F/u from  ER on 5/17 or 5/18 (records pending) for difficulty breathing. EKG and chest XR normal. Did breathing treatment which helped significantly. Suggested possible asthma. Pt has been complaining of recurrent cough for some time. Did trial of Singulair which did help, but he stopped taking it for some reason. They gave him an albuterol inhaler at home, and used a couple of time and it does seem to help.   No issues with asthma during childhood.   Cold air seems to make symptoms worse. Also worse when laying down. No real production.   Takes zyrtec regularly for allergies- has not made a difference with cough     The following portions of the patient's history were reviewed and updated as appropriate: allergies, current medications, past family history, past medical history, past social history, past surgical history and problem list.    Also has concerns about left shoulder pain X 2 months. Feels along outside of L shoulder blade all the way around. No injury. Pain is off and on. Does do a lot of activity with [employer] business, however states pain can present even when he is not very active. ROM of shoulder is not affected. Stretching and sitting up straight helps relieve pain   No chest pain     Review of Systems   Constitutional: Negative.  Negative for chills, diaphoresis, fatigue and fever.   HENT: Negative.  Negative for congestion, ear discharge, ear pain, hearing loss, nosebleeds, postnasal drip, sinus pressure, sneezing and sore throat.    Eyes: Negative.    Respiratory: Positive for cough, shortness of breath and wheezing. Negative for chest tightness.    Cardiovascular: Negative.   Negative for chest pain, palpitations and leg swelling.   Gastrointestinal: Negative for abdominal distention, abdominal pain, blood in stool, constipation, diarrhea, nausea and vomiting.   Genitourinary: Negative.  Negative for difficulty urinating, dysuria, flank pain, frequency, hematuria and urgency.   Musculoskeletal: Positive for arthralgias. Negative for back pain, gait problem, joint swelling, myalgias, neck pain and neck stiffness.   Skin: Negative.  Negative for color change, pallor, rash and wound.   Neurological: Negative for dizziness, syncope, weakness, light-headedness, numbness and headaches.       Objective   Vitals:    05/30/19 0902   BP: 104/68   Pulse: 76   Resp: 18   Temp: 97.2 °F (36.2 °C)      Physical Exam   Constitutional: He is oriented to person, place, and time. He appears well-developed and well-nourished.   HENT:   Head: Normocephalic and atraumatic.   Right Ear: Tympanic membrane, external ear and ear canal normal.   Left Ear: Tympanic membrane, external ear and ear canal normal.   Nose: Nose normal.   Mouth/Throat: Oropharynx is clear and moist. No oropharyngeal exudate.   Eyes: Conjunctivae are normal.   Neck: Normal range of motion. Neck supple. No tracheal deviation present. No thyromegaly present.   Cardiovascular: Normal rate, regular rhythm, normal heart sounds and intact distal pulses. Exam reveals no gallop and no friction rub.   No murmur heard.  Pulmonary/Chest: Effort normal and breath sounds normal. No respiratory distress. He has no rales. He exhibits no tenderness.   Scattered wheezes    Abdominal: Soft. Bowel sounds are normal. He exhibits no distension and no mass. There is no tenderness. There is no rebound and no guarding. No hernia.   Musculoskeletal: Normal range of motion. He exhibits no edema or deformity.        Left shoulder: He exhibits tenderness and bony tenderness. He exhibits normal range of motion, no swelling, no effusion, no crepitus, no deformity and  no laceration.        Cervical back: Normal.        Thoracic back: Normal.        Back:    Lymphadenopathy:     He has no cervical adenopathy.   Neurological: He is alert and oriented to person, place, and time.   Skin: Skin is warm and dry.   Psychiatric: He has a normal mood and affect. His behavior is normal. Judgment and thought content normal.   Nursing note and vitals reviewed.      Assessment/Plan   Dragan was seen today for shortness of breath.    Diagnoses and all orders for this visit:    Wheezing  -     Full Pulmonary Function Test With Bronchodilator    SOB (shortness of breath)  -     Full Pulmonary Function Test With Bronchodilator    Chronic cough    Acute pain of left shoulder  -     Ambulatory Referral to Physical Therapy Evaluate and treat    due to concern for possible asthma, will proceed with pulmonary function tests with and without bronchodilator  For shoulder, encourage good posture, routine stretching, rest, NSAIDs.  Pt would also like to do some PT for this issue. Order placed   F/u pending PFT results

## 2019-05-30 NOTE — PROGRESS NOTES
I have reviewed the notes, assessments, and/or procedures performed by JESSY Beard, I concur with her/his documentation of Dragan Shultz.

## 2019-06-03 ENCOUNTER — OFFICE VISIT (OUTPATIENT)
Dept: PULMONOLOGY | Facility: CLINIC | Age: 49
End: 2019-06-03

## 2019-06-03 DIAGNOSIS — R05.9 COUGH: Primary | ICD-10-CM

## 2019-06-03 PROCEDURE — 94726 PLETHYSMOGRAPHY LUNG VOLUMES: CPT | Performed by: INTERNAL MEDICINE

## 2019-06-03 PROCEDURE — 94060 EVALUATION OF WHEEZING: CPT | Performed by: INTERNAL MEDICINE

## 2019-06-03 PROCEDURE — 94729 DIFFUSING CAPACITY: CPT | Performed by: INTERNAL MEDICINE

## 2019-06-03 RX ORDER — ALBUTEROL SULFATE 90 UG/1
4 AEROSOL, METERED RESPIRATORY (INHALATION) ONCE
Status: COMPLETED | OUTPATIENT
Start: 2019-06-03 | End: 2019-06-03

## 2019-06-03 RX ADMIN — ALBUTEROL SULFATE 4 PUFF: 90 AEROSOL, METERED RESPIRATORY (INHALATION) at 09:39

## 2019-06-06 DIAGNOSIS — R05.3 CHRONIC COUGH: Primary | ICD-10-CM

## 2019-06-07 ENCOUNTER — TELEPHONE (OUTPATIENT)
Dept: FAMILY MEDICINE CLINIC | Facility: CLINIC | Age: 49
End: 2019-06-07

## 2019-06-07 DIAGNOSIS — M25.512 ACUTE PAIN OF LEFT SHOULDER: Primary | ICD-10-CM

## 2019-06-07 NOTE — TELEPHONE ENCOUNTER
----- Message from Kaitlyn Mims sent at 6/7/2019 10:24 AM EDT -----  Contact: DR OWENS   [facility] NEEDS A NEW ORDER WITH YOUR NAME ON IT BECAUSE [facility] IS NOT CREDENTIALED WITH MEDICAID. PLEASE FAX TO [phone]

## 2019-06-17 ENCOUNTER — TELEPHONE (OUTPATIENT)
Dept: FAMILY MEDICINE CLINIC | Facility: CLINIC | Age: 49
End: 2019-06-17

## 2019-06-17 DIAGNOSIS — M25.512 CHRONIC LEFT SHOULDER PAIN: Primary | ICD-10-CM

## 2019-06-17 DIAGNOSIS — G89.29 CHRONIC LEFT SHOULDER PAIN: Primary | ICD-10-CM

## 2019-06-17 NOTE — TELEPHONE ENCOUNTER
----- Message from Shaw Booker Rep sent at 6/17/2019 10:13 AM EDT -----  Contact: PT SON RADHA; CHAKA  PT SHOULDER PAIN HAS GOT WORSE.  PT IS NOT HELPING MUCH AT ALL.  HE WANTS TO KNOW DOES PATIENT NEED TO COME BACK AND SEE MARCELLUSARRI OR BE REFERRED TO A SPECIALIST?    RADHA: [phone]

## 2019-06-24 ENCOUNTER — OFFICE VISIT (OUTPATIENT)
Dept: ORTHOPEDIC SURGERY | Facility: CLINIC | Age: 49
End: 2019-06-24

## 2019-06-24 VITALS — HEIGHT: 68 IN | OXYGEN SATURATION: 99 % | HEART RATE: 87 BPM | WEIGHT: 160.94 LBS | BODY MASS INDEX: 24.39 KG/M2

## 2019-06-24 DIAGNOSIS — M25.512 LEFT SHOULDER PAIN, UNSPECIFIED CHRONICITY: Primary | ICD-10-CM

## 2019-06-24 DIAGNOSIS — M75.91 SUPRASPINATUS TENDINITIS, RIGHT: ICD-10-CM

## 2019-06-24 PROCEDURE — 99203 OFFICE O/P NEW LOW 30 MIN: CPT | Performed by: ORTHOPAEDIC SURGERY

## 2019-06-24 NOTE — PROGRESS NOTES
[facility] [place] [facility] Note    Subjective     Chief Complaint   Patient presents with   • Left Shoulder - Pain     Patient has pain when resting in the left shoulder, specifically in the back.  Patient says he has been working more in the past month, however pain isn't present during activity. No previous treatment other than Physical therapy.  Uses Advil in morning         HPI  Dragan Shultz is a 49 y.o. male.  He complains of left shoulder pain.  Pain is about his scapula.  Is 9 out of 10.  Is dull aching throbbing.  It radiates down the arm little bit.  No chest pain.  No shortness of breath.  It hurts at rest more than with activity.    Past Medical History:   Diagnosis Date   • Hyperlipidemia    • Pre-diabetes       No past surgical history on file.   Family History   Problem Relation Age of Onset   • Diabetes Paternal Grandfather    • Hypertension Paternal Grandfather    • Hyperlipidemia Paternal Grandfather    • Stroke Paternal Grandfather      Social History     Socioeconomic History   • Marital status:      Spouse name: Not on file   • Number of children: Not on file   • Years of education: Not on file   • Highest education level: Not on file   Tobacco Use   • Smoking status: Never Smoker   • Smokeless tobacco: Never Used   Substance and Sexual Activity   • Alcohol use: No   • Drug use: No      Current Outpatient Medications on File Prior to Visit   Medication Sig Dispense Refill   • cetirizine (zyrTEC) 10 MG tablet Take 10 mg by mouth Daily.     • simvastatin (ZOCOR) 10 MG tablet Take 1 tablet by mouth Every Night. 30 tablet 2     No current facility-administered medications on file prior to visit.       No Known Allergies     The following portions of the patient's history were reviewed and updated as appropriate: allergies, current medications, past family history, past medical history, past social history, past surgical history and problem list.    Review of Systems  "  Constitutional: Positive for activity change.   HENT: Negative.    Eyes: Negative.    Respiratory: Negative.    Cardiovascular: Negative.    Gastrointestinal: Negative.    Endocrine: Negative.    Genitourinary: Negative.    Musculoskeletal: Positive for arthralgias (left shoulder).   Skin: Negative.    Allergic/Immunologic: Negative.    Neurological: Negative.    Hematological: Negative.    Psychiatric/Behavioral: Negative.         Objective      Physical Exam  Pulse 87   Ht 172.7 cm (68\")   Wt 73 kg (160 lb 15 oz)   SpO2 99%   BMI 24.47 kg/m²     Body mass index is 24.47 kg/m².    GENERAL APPEARANCE: awake, alert & oriented x 3, in no acute distress and well developed, well nourished  PSYCH: normal mood and affect  LUNGS:  breathing nonlabored, no wheezing  EYES: sclera anicteric, pupils equal  CARDIOVASCULAR: palpable pulses dorsalis pedis, palpable posterior tibial bilaterally. Capillary refill less than 2 seconds  INTEGUMENTARY: skin intact, no clubbing, cyanosis  NEUROLOGIC:  Normal Sensation and reflexes       Ortho Exam  Musculoskeletal   Upper Extremity   Left Shoulder     Inspection and Palpation:     Tenderness - none     Crepitus - none    Sensation is normal    Examination reveals no ecchymosis.      Strength and Tone:    Supraspinatus, Infraspinatus - 5/5    Subscapularis - 5/5    Deltoid - 5/5   Range of Motion   Right shoulder:    Internal Rotation: ROM - T7    External Rotation: AROM - 80 degrees    Elevation through flexion: AROM - 180 degrees     Abduction - 180   Left Shoulder:    Internal Rotation: ROM - T7    External Rotation: AROM - 80 degrees    Elevation through flexion: AROM - 180 degrees     Abduction - 180 degrees   Instability   Left shoulder    Sulcus sign negative    Apprehension test negative    Latasha relocation test negative    Jerk test negative   Impingement   Left shoulder    Moreno impingement test positive    Neer impingement test positive   Functional Testing   Left " shoulder    AC crossover adduction test negative    Abdominal compression test negative    Lift-off sign negative    Speed's test negative    Lemus's test negative    Horriblower's sign negative     Imaging/Studies  Imaging Results (last 7 days)     Procedure Component Value Units Date/Time    XR Shoulder 2+ View Left [[id number]] Resulted:  06/24/19 1045     Updated:  06/24/19 1045    Narrative:       Left Shoulder X-Ray  Indication: Pain  AP, scapular Y, and axillary lateral views    Findings:  No fracture  No bony lesion  Normal soft tissues  Normal joint spaces    No prior studies were available for comparison.              Assessment/Plan        ICD-10-CM ICD-9-CM   1. Left shoulder pain, unspecified chronicity M25.512 719.41   2. Supraspinatus tendinitis, right M75.91 726.10       Orders Placed This Encounter   Procedures   • XR Shoulder 2+ View Left   • MRI Shoulder Left Without Contrast      He has failed a month of physical therapy.  I will order the MRI see him back after the MRI left shoulder.  His motion and strength is good and most likely he will not need surgery.    Medical Decision Making  Management Options : over-the-counter medicine and physical/occupational therapy  Data/Risk: radiology tests and independent visualization of imaging, lab tests, or EMG/NCV    Rio Law MD  06/24/19  10:47 AM         EMR Dragon/Transcription disclaimer:  Much of this encounter note is an electronic transcription of spoken language to printed text. Electronic transcription of spoken language may permit erroneous, or at times, nonsensical words or phrases to be inadvertently transcribed. Although I have reviewed the note for such errors, some may still exist.

## 2019-07-02 ENCOUNTER — HOSPITAL ENCOUNTER (OUTPATIENT)
Dept: MRI IMAGING | Facility: HOSPITAL | Age: 49
Discharge: HOME | End: 2019-07-02
Admitting: ORTHOPAEDIC SURGERY

## 2019-07-02 ENCOUNTER — APPOINTMENT (OUTPATIENT)
Dept: MRI IMAGING | Facility: HOSPITAL | Age: 49
End: 2019-07-02

## 2019-07-02 DIAGNOSIS — M25.512 LEFT SHOULDER PAIN, UNSPECIFIED CHRONICITY: ICD-10-CM

## 2019-07-02 DIAGNOSIS — M75.91 SUPRASPINATUS TENDINITIS, RIGHT: ICD-10-CM

## 2019-07-02 PROCEDURE — 73221 MRI JOINT UPR EXTREM W/O DYE: CPT

## 2019-07-03 ENCOUNTER — APPOINTMENT (OUTPATIENT)
Dept: MRI IMAGING | Facility: HOSPITAL | Age: 49
End: 2019-07-03

## 2019-07-03 ENCOUNTER — OFFICE VISIT (OUTPATIENT)
Dept: ORTHOPEDIC SURGERY | Facility: CLINIC | Age: 49
End: 2019-07-03

## 2019-07-03 VITALS — WEIGHT: 160.94 LBS | OXYGEN SATURATION: 97 % | HEIGHT: 68 IN | BODY MASS INDEX: 24.39 KG/M2 | HEART RATE: 83 BPM

## 2019-07-03 DIAGNOSIS — M75.92 SUPRASPINATUS TENDINITIS, LEFT: Primary | ICD-10-CM

## 2019-07-03 PROCEDURE — 99213 OFFICE O/P EST LOW 20 MIN: CPT | Performed by: ORTHOPAEDIC SURGERY

## 2019-07-03 NOTE — PROGRESS NOTES
[facility] [place] [facility] [place]    Subjective     Chief Complaint   Patient presents with   • Left Shoulder - Follow-up     After MRI (07/02/2019)         KEY Shultz is a 49 y.o. male.  Follow-up left shoulder MRI.  Pain is 7 out of 10.  He has not been in physical therapy recently.    Past Medical History:   Diagnosis Date   • Hyperlipidemia    • Pre-diabetes       History reviewed. No pertinent surgical history.   Family History   Problem Relation Age of Onset   • Diabetes Paternal Grandfather    • Hypertension Paternal Grandfather    • Hyperlipidemia Paternal Grandfather    • Stroke Paternal Grandfather      Social History     Socioeconomic History   • Marital status:      Spouse name: Not on file   • Number of children: Not on file   • Years of education: Not on file   • Highest education level: Not on file   Tobacco Use   • Smoking status: Never Smoker   • Smokeless tobacco: Never Used   Substance and Sexual Activity   • Alcohol use: No   • Drug use: No      Current Outpatient Medications on File Prior to Visit   Medication Sig Dispense Refill   • cetirizine (zyrTEC) 10 MG tablet Take 10 mg by mouth Daily.     • simvastatin (ZOCOR) 10 MG tablet Take 1 tablet by mouth Every Night. 30 tablet 2     No current facility-administered medications on file prior to visit.       No Known Allergies     The following portions of the patient's history were reviewed and updated as appropriate: allergies, current medications, past family history, past medical history, past social history, past surgical history and problem list.    Review of Systems   Constitutional: Negative.    HENT: Negative.    Eyes: Negative.    Respiratory: Negative.    Cardiovascular: Negative.    Gastrointestinal: Negative.    Endocrine: Negative.    Genitourinary: Negative.    Musculoskeletal: Positive for arthralgias.   Skin: Negative.    Allergic/Immunologic: Negative.    Neurological: Negative.    Hematological:  "Negative.    Psychiatric/Behavioral: Negative.         Objective      Physical Exam  Pulse 83   Ht 172.7 cm (67.99\")   Wt 73 kg (160 lb 15 oz)   SpO2 97%   BMI 24.48 kg/m²     Body mass index is 24.48 kg/m².    GENERAL APPEARANCE: awake, alert & oriented x 3, in no acute distress and well developed, well nourished  PSYCH: normal mood and affect  Ortho Exam  Musculoskeletal   Upper Extremity   Left Shoulder     Inspection and Palpation:     Tenderness - none     Crepitus - none    Sensation is normal    Examination reveals no ecchymosis.      Strength and Tone:    Supraspinatus, Infraspinatus - 5/5    Subscapularis - 5/5    Deltoid - 5/5   Range of Motion   Right shoulder:    Internal Rotation: ROM - T7    External Rotation: AROM - 80 degrees    Elevation through flexion: AROM - 180 degrees     Abduction - 180   Left Shoulder:    Internal Rotation: ROM - T7    External Rotation: AROM - 80 degrees    Elevation through flexion: AROM - 180 degrees     Abduction - 180 degrees   Instability   Left shoulder    Sulcus sign negative    Apprehension test negative    Latasha relocation test negative    Jerk test negative   Impingement   Left shoulder    Moreno impingement test positive    Neer impingement test positive   Functional Testing   Left shoulder    AC crossover adduction test negative    Abdominal compression test negative    Lift-off sign negative    Speed's test negative    Lemus's test negative    Horriblower's sign negative     Imaging/Studies  Imaging Results (last 7 days)     ** No results found for the last 168 hours. **      I viewed his MRI from July 2 which shows rotator cuff tendinitis    Assessment/Plan        ICD-10-CM ICD-9-CM   1. Supraspinatus tendinitis, left M75.92 726.10       Orders Placed This Encounter   Procedures   • Ambulatory Referral to Physical Therapy   The  plan is physical therapy, anti-inflammatories and follow-up in 6 weeks.    Medical Decision Making  Management Options : " over-the-counter medicine and physical/occupational therapy  Data/Risk: radiology tests and independent visualization of imaging, lab tests, or EMG/NCV    Rio Law MD  07/03/19  3:59 PM         EMR Dragon/Transcription disclaimer:  Much of this encounter note is an electronic transcription of spoken language to printed text. Electronic transcription of spoken language may permit erroneous, or at times, nonsensical words or phrases to be inadvertently transcribed. Although I have reviewed the note for such errors, some may still exist.

## 2019-08-07 ENCOUNTER — OFFICE VISIT (OUTPATIENT)
Dept: PULMONOLOGY | Facility: CLINIC | Age: 49
End: 2019-08-07

## 2019-08-07 ENCOUNTER — OFFICE VISIT (OUTPATIENT)
Dept: ORTHOPEDIC SURGERY | Facility: CLINIC | Age: 49
End: 2019-08-07

## 2019-08-07 VITALS
SYSTOLIC BLOOD PRESSURE: 130 MMHG | DIASTOLIC BLOOD PRESSURE: 78 MMHG | TEMPERATURE: 98.6 F | OXYGEN SATURATION: 96 % | BODY MASS INDEX: 24.4 KG/M2 | HEIGHT: 68 IN | HEART RATE: 56 BPM | WEIGHT: 161 LBS

## 2019-08-07 VITALS — HEIGHT: 68 IN | HEART RATE: 65 BPM | OXYGEN SATURATION: 98 % | BODY MASS INDEX: 23.59 KG/M2 | WEIGHT: 155.65 LBS

## 2019-08-07 DIAGNOSIS — J30.9 ALLERGIC RHINITIS, UNSPECIFIED SEASONALITY, UNSPECIFIED TRIGGER: ICD-10-CM

## 2019-08-07 DIAGNOSIS — R05.9 COUGH: Primary | ICD-10-CM

## 2019-08-07 DIAGNOSIS — M75.92 SUPRASPINATUS TENDINITIS, LEFT: Primary | ICD-10-CM

## 2019-08-07 DIAGNOSIS — J45.40 MODERATE PERSISTENT ASTHMA, UNSPECIFIED WHETHER COMPLICATED: ICD-10-CM

## 2019-08-07 PROCEDURE — 99214 OFFICE O/P EST MOD 30 MIN: CPT | Performed by: INTERNAL MEDICINE

## 2019-08-07 PROCEDURE — 94726 PLETHYSMOGRAPHY LUNG VOLUMES: CPT | Performed by: INTERNAL MEDICINE

## 2019-08-07 PROCEDURE — 94729 DIFFUSING CAPACITY: CPT | Performed by: INTERNAL MEDICINE

## 2019-08-07 PROCEDURE — 99212 OFFICE O/P EST SF 10 MIN: CPT | Performed by: ORTHOPAEDIC SURGERY

## 2019-08-07 PROCEDURE — 94375 RESPIRATORY FLOW VOLUME LOOP: CPT | Performed by: INTERNAL MEDICINE

## 2019-08-07 RX ORDER — FLUTICASONE PROPIONATE 50 MCG
2 SPRAY, SUSPENSION (ML) NASAL 2 TIMES DAILY
Qty: 18.2 ML | Refills: 11 | Status: SHIPPED | OUTPATIENT
Start: 2019-08-07 | End: 2020-06-24

## 2019-08-07 NOTE — PROGRESS NOTES
CC:    Shortness of breath and cough    HPI:    50 y/o  male w/ h/o lifetime non-smoking, pre-diabetes, HLD, latent TB - treated, originally from [place] and moved her 12 years ago, who is referred for cough and shortness of breath.  Patient reports symptoms of runny nose, sneezing, watery eyes, and dry cough for the past several years.  He also notes some shortness of breath around freshly cut grass.  He was noted to be wheezing on exam in his PCP's office.  Cough has been worse since March.  Has improved some with zyrtec but still has a scratchy throat and voice changes.    PMH:    Past Medical History:   Diagnosis Date   • Hyperlipidemia    • Pre-diabetes      PSH:    No past surgical history on file.  FH:    Family History   Problem Relation Age of Onset   • Diabetes Paternal Grandfather    • Hypertension Paternal Grandfather    • Hyperlipidemia Paternal Grandfather    • Stroke Paternal Grandfather    • Diabetes Father    • Stroke Father      SH:    Social History     Socioeconomic History   • Marital status:      Spouse name: Not on file   • Number of children: Not on file   • Years of education: Not on file   • Highest education level: Not on file   Tobacco Use   • Smoking status: Never Smoker   • Smokeless tobacco: Never Used   Substance and Sexual Activity   • Alcohol use: No   • Drug use: No   • Sexual activity: Defer     ALLERGIES:    No Known Allergies  MEDICATIONS:      Current Outpatient Medications:   •  simvastatin (ZOCOR) 10 MG tablet, Take 1 tablet by mouth Every Night., Disp: 30 tablet, Rfl: 2  •  cetirizine (zyrTEC) 10 MG tablet, Take 10 mg by mouth Daily., Disp: , Rfl:   ROS:  Per HPI, otherwise all systems reviewed and negative.    DIAGNOSTIC DATA (Reviewed and interpreted by me unless otherwise specified):    CXR 8/7/19 - no acute lung disease, calcified hilar ln's, calcified granuloma    PFT 8/7/19 - moderate restriction, high Raw, normal DLCO    PFT 6/3/19 - no obstruction by ATS  "criteria, small airway dysfunction, small airways with good response to bronchodilator (not large airways), +air trapping, mild reduction in DLCO corrects for alveolar volume    Vitals:    08/07/19 1022   BP: 130/78   Pulse: 56   Temp: 98.6 °F (37 °C)   SpO2: 96%       Physical Exam   Constitutional: Oriented to person, place, and time. Appears well-developed and well-nourished.   Head: Normocephalic and atraumatic.   Nose: Nose normal.   Mouth/Throat: Oropharynx with cobblestonning  Eyes: Conjunctivae are normal.  Pupils normal.  Neck: No tracheal deviation present.   Cardiovascular: Normal rate, regular rhythm, normal heart sounds and intact distal pulses.  Exam reveals no gallop and no friction rub.  No thrill.  No JVD.  No edema.  No murmur heard.  Pulmonary/Chest: Effort normal and breath sounds normal.  No tenderness to palpation.  No clubbing.   Abdominal: Soft. Bowel sounds are normal. No distension. No tenderness. There is no guarding.   Musculoskeletal: Normal range of motion.  No tenderness.  Lymphadenopathy:  No cervical adenopathy.   Neurological:  No new focal neurological deficits observed   Skin: Skin is warm and dry. No rash noted.   Psychiatric: Normal mood and affect.  Behavior is normal. Judgment normal.    Assessment/Plan     1)  Moderate Persistent Asthma - I think he is a \"poor perceiver\" since there is evidence of obstruction on PFTs.  Will start Breo 100 daily.    2) Allergic Rhinitis - continue zyrtec, add flonase 2 sprays each nostril bid    RTC in 2 months w/ repeat vy    Vikash Fox MD  Pulmonology and Critical Care Medicine  08/07/19 10:43 AM  Electronically Signed    C.C.:  JESSY Beard, Robin Martinez PA        "

## 2019-08-07 NOTE — PROGRESS NOTES
[facility] [place] [facility] Note    Subjective     Chief Complaint   Patient presents with   • Left Shoulder - Follow-up     Supraspinatus Tendinitis        HPI  Dragan Shultz is a 49 y.o. male.  He is doing better.  He did go to physical therapy but is doing better.    Past Medical History:   Diagnosis Date   • Hyperlipidemia    • Pre-diabetes       History reviewed. No pertinent surgical history.   Family History   Problem Relation Age of Onset   • Diabetes Paternal Grandfather    • Hypertension Paternal Grandfather    • Hyperlipidemia Paternal Grandfather    • Stroke Paternal Grandfather    • Diabetes Father    • Stroke Father      Social History     Socioeconomic History   • Marital status:      Spouse name: Not on file   • Number of children: Not on file   • Years of education: Not on file   • Highest education level: Not on file   Tobacco Use   • Smoking status: Never Smoker   • Smokeless tobacco: Never Used   Substance and Sexual Activity   • Alcohol use: No   • Drug use: No   • Sexual activity: Defer      Current Outpatient Medications on File Prior to Visit   Medication Sig Dispense Refill   • cetirizine (zyrTEC) 10 MG tablet Take 10 mg by mouth Daily.     • fluticasone (FLONASE) 50 MCG/ACT nasal spray 2 sprays by Each Nare route 2 (Two) Times a Day. 18.2 mL 11   • Fluticasone Furoate-Vilanterol (BREO ELLIPTA) 100-25 MCG/INH inhaler Inhale 1 puff Daily. 1 each 11   • simvastatin (ZOCOR) 10 MG tablet Take 1 tablet by mouth Every Night. 30 tablet 2     No current facility-administered medications on file prior to visit.       No Known Allergies     The following portions of the patient's history were reviewed and updated as appropriate: allergies, current medications, past family history, past medical history, past social history, past surgical history and problem list.    Review of Systems   Constitutional: Negative.    HENT: Negative.    Eyes: Negative.    Respiratory: Negative.   "  Cardiovascular: Negative.    Gastrointestinal: Negative.    Endocrine: Negative.    Genitourinary: Negative.    Musculoskeletal: Positive for joint swelling.   Skin: Negative.    Allergic/Immunologic: Negative.    Neurological: Negative.    Hematological: Negative.    Psychiatric/Behavioral: Negative.         Objective      Physical Exam  Pulse 65   Ht 172.7 cm (67.99\")   Wt 70.6 kg (155 lb 10.3 oz)   SpO2 98%   BMI 23.67 kg/m²     Body mass index is 23.67 kg/m².    GENERAL APPEARANCE: awake, alert & oriented x 3, in no acute distress and well developed, well nourished  PSYCH: normal mood and affect  LUNGS:  breathing nonlabored, no wheezing  EYES: sclera anicteric, pupils equal  CARDIOVASCULAR: palpable pulses dorsalis pedis, palpable posterior tibial bilaterally. Capillary refill less than 2 seconds  INTEGUMENTARY: skin intact, no clubbing, cyanosis  NEUROLOGIC:  Normal Sensation and reflexes       Ortho Exam  Musculoskeletal   Upper Extremity   Left Shoulder     Inspection and Palpation:     Tenderness - none     Crepitus - none    Sensation is normal    Examination reveals no ecchymosis.      Strength and Tone:    Supraspinatus, Infraspinatus - 5/5    Subscapularis - 5/5    Deltoid - 5/5   Range of Motion   Right shoulder:    Internal Rotation: ROM - T7    External Rotation: AROM - 80 degrees    Elevation through flexion: AROM - 180 degrees     Abduction - 180   Left Shoulder:    Internal Rotation: ROM - T7    External Rotation: AROM - 80 degrees    Elevation through flexion: AROM - 180 degrees     Abduction - 180 degrees   Instability   Left shoulder    Sulcus sign negative    Apprehension test negative    Latasha relocation test negative    Jerk test negative   Impingement   Left shoulder    Moreno impingement test positive    Neer impingement test positive   Functional Testing   Left shoulder    AC crossover adduction test negative    Abdominal compression test negative    Lift-off sign " negative    Speed's test negative    Lemus's test negative    Horriblower's sign negative     Imaging/Studies  Imaging Results (last 7 days)     ** No results found for the last 168 hours. **          Assessment/Plan        ICD-10-CM ICD-9-CM   1. Supraspinatus tendinitis, left M75.92 726.10     He will continue home exercise program and follow-up as needed.  Medical Decision Making  Management Options : over-the-counter medicine      Rio Law MD  08/07/19  1:29 PM         EMR Dragon/Transcription disclaimer:  Much of this encounter note is an electronic transcription of spoken language to printed text. Electronic transcription of spoken language may permit erroneous, or at times, nonsensical words or phrases to be inadvertently transcribed. Although I have reviewed the note for such errors, some may still exist.

## 2019-10-08 ENCOUNTER — OFFICE VISIT (OUTPATIENT)
Dept: PULMONOLOGY | Facility: CLINIC | Age: 49
End: 2019-10-08

## 2019-10-08 VITALS
HEART RATE: 78 BPM | HEIGHT: 68 IN | TEMPERATURE: 98.2 F | SYSTOLIC BLOOD PRESSURE: 108 MMHG | WEIGHT: 164.4 LBS | OXYGEN SATURATION: 97 % | DIASTOLIC BLOOD PRESSURE: 60 MMHG | BODY MASS INDEX: 24.92 KG/M2

## 2019-10-08 DIAGNOSIS — J45.40 MODERATE PERSISTENT ASTHMA, UNSPECIFIED WHETHER COMPLICATED: Primary | ICD-10-CM

## 2019-10-08 PROCEDURE — 99213 OFFICE O/P EST LOW 20 MIN: CPT | Performed by: INTERNAL MEDICINE

## 2019-10-08 PROCEDURE — 94010 BREATHING CAPACITY TEST: CPT | Performed by: INTERNAL MEDICINE

## 2019-10-08 RX ORDER — ALBUTEROL SULFATE 90 UG/1
2 AEROSOL, METERED RESPIRATORY (INHALATION) EVERY 4 HOURS PRN
Qty: 6.7 G | Refills: 11 | Status: SHIPPED | OUTPATIENT
Start: 2019-10-08 | End: 2020-06-24

## 2019-10-08 NOTE — PROGRESS NOTES
CC:    Shortness of breath and cough    HPI:    50 y/o  male w/ h/o lifetime non-smoking, pre-diabetes, HLD, latent TB - treated, originally from [place] and moved her 12 years ago, who is referred for cough and shortness of breath.  Patient reports symptoms of runny nose, sneezing, watery eyes, and dry cough for the past several years.  He also notes some shortness of breath around freshly cut grass.  He was noted to be wheezing on exam in his PCP's office.  Cough has been worse since March.  Has improved some with zyrtec but still has a scratchy throat and voice changes.    INTERVAL HISTORY:    Patient returns today for follow up.  Symptoms have completely resolved with treatment.    PMH:    Past Medical History:   Diagnosis Date   • Hyperlipidemia    • Pre-diabetes      PSH:    No past surgical history on file.  FH:    Family History   Problem Relation Age of Onset   • Diabetes Paternal Grandfather    • Hypertension Paternal Grandfather    • Hyperlipidemia Paternal Grandfather    • Stroke Paternal Grandfather    • Diabetes Father    • Stroke Father      SH:    Social History     Socioeconomic History   • Marital status:      Spouse name: Not on file   • Number of children: Not on file   • Years of education: Not on file   • Highest education level: Not on file   Tobacco Use   • Smoking status: Never Smoker   • Smokeless tobacco: Never Used   Substance and Sexual Activity   • Alcohol use: No   • Drug use: No   • Sexual activity: Defer     ALLERGIES:    No Known Allergies  MEDICATIONS:      Current Outpatient Medications:   •  cetirizine (zyrTEC) 10 MG tablet, Take 10 mg by mouth Daily., Disp: , Rfl:   •  fluticasone (FLONASE) 50 MCG/ACT nasal spray, 2 sprays by Each Nare route 2 (Two) Times a Day., Disp: 18.2 mL, Rfl: 11  •  Fluticasone Furoate-Vilanterol (BREO ELLIPTA) 100-25 MCG/INH inhaler, Inhale 1 puff Daily., Disp: 1 each, Rfl: 11  •  simvastatin (ZOCOR) 10 MG tablet, Take 1 tablet by mouth Every  Night., Disp: 30 tablet, Rfl: 2  ROS:  Per HPI, otherwise all systems reviewed and negative.    DIAGNOSTIC DATA (Reviewed and interpreted by me unless otherwise specified):    CXR 8/7/19 - no acute lung disease, calcified hilar ln's, calcified granuloma    PFT 6/3/19 - no obstruction by ATS criteria, small airway dysfunction, small airways with good response to bronchodilator (not large airways), +air trapping, mild reduction in DLCO corrects for alveolar volume    PFT 8/7/19 - moderate restriction, high Raw, normal DLCO    [place] 10/8/19 - no obstruction by ATS criteria, FEV1 and FVC improved from prior    Vitals:    10/08/19 1418   BP: 108/60   Pulse: 78   Temp: 98.2 °F (36.8 °C)   SpO2: 97%       Physical Exam   Constitutional: Oriented to person, place, and time. Appears well-developed and well-nourished.   Head: Normocephalic and atraumatic.   Nose: Nose normal.   Mouth/Throat: Oropharynx clear  Eyes: Conjunctivae are normal.  Pupils normal.  Neck: No tracheal deviation present.   Cardiovascular: Normal rate, regular rhythm, normal heart sounds and intact distal pulses.  Exam reveals no gallop and no friction rub.  No thrill.  No JVD.  No edema.  No murmur heard.  Pulmonary/Chest: Effort normal and breath sounds normal.  No tenderness to palpation.  No clubbing.   Abdominal: Soft. Bowel sounds are normal. No distension. No tenderness. There is no guarding.   Musculoskeletal: Normal range of motion.  No tenderness.  Lymphadenopathy:  No cervical adenopathy.   Neurological:  No new focal neurological deficits observed   Skin: Skin is warm and dry. No rash noted.   Psychiatric: Normal mood and affect.  Behavior is normal. Judgment normal.    Assessment/Plan     1)  Mild intermittent asthma - prn sami, keep breo script active in case symptoms flare    2) Allergic Rhinitis - use zyrtec & flonase 2 sprays each nostril bid prn, currently seems to be doing well, trial of stopping therapy    RTC in 6 months w/  vy Fox MD  Pulmonology and Critical Care Medicine  10/08/19 2:55 PM  Electronically Signed    C.C.:  JESSY Beard, Robin Martinez PA

## 2020-06-24 ENCOUNTER — OFFICE VISIT (OUTPATIENT)
Dept: FAMILY MEDICINE CLINIC | Facility: CLINIC | Age: 50
End: 2020-06-24

## 2020-06-24 VITALS
DIASTOLIC BLOOD PRESSURE: 62 MMHG | SYSTOLIC BLOOD PRESSURE: 102 MMHG | WEIGHT: 171 LBS | OXYGEN SATURATION: 96 % | HEIGHT: 68 IN | TEMPERATURE: 98.7 F | RESPIRATION RATE: 16 BRPM | HEART RATE: 85 BPM | BODY MASS INDEX: 25.91 KG/M2

## 2020-06-24 DIAGNOSIS — J45.40 MODERATE PERSISTENT ASTHMA, UNSPECIFIED WHETHER COMPLICATED: Primary | ICD-10-CM

## 2020-06-24 DIAGNOSIS — Z12.5 SCREENING FOR PROSTATE CANCER: ICD-10-CM

## 2020-06-24 DIAGNOSIS — Z11.59 NEED FOR HEPATITIS C SCREENING TEST: ICD-10-CM

## 2020-06-24 DIAGNOSIS — E61.1 LOW IRON: ICD-10-CM

## 2020-06-24 DIAGNOSIS — R73.03 PRE-DIABETES: ICD-10-CM

## 2020-06-24 DIAGNOSIS — M77.8 RIGHT WRIST TENDINITIS: ICD-10-CM

## 2020-06-24 DIAGNOSIS — M77.12 LEFT TENNIS ELBOW: ICD-10-CM

## 2020-06-24 DIAGNOSIS — R06.09 DYSPNEA ON EXERTION: ICD-10-CM

## 2020-06-24 DIAGNOSIS — E78.2 MIXED HYPERLIPIDEMIA: ICD-10-CM

## 2020-06-24 DIAGNOSIS — E53.8 LOW VITAMIN B12 LEVEL: ICD-10-CM

## 2020-06-24 DIAGNOSIS — J30.2 SEASONAL ALLERGIES: ICD-10-CM

## 2020-06-24 DIAGNOSIS — E55.9 VITAMIN D DEFICIENCY: ICD-10-CM

## 2020-06-24 PROCEDURE — 99214 OFFICE O/P EST MOD 30 MIN: CPT | Performed by: PHYSICIAN ASSISTANT

## 2020-06-24 PROCEDURE — 96372 THER/PROPH/DIAG INJ SC/IM: CPT | Performed by: PHYSICIAN ASSISTANT

## 2020-06-24 RX ORDER — KETOROLAC TROMETHAMINE 30 MG/ML
60 INJECTION, SOLUTION INTRAMUSCULAR; INTRAVENOUS ONCE
Status: COMPLETED | OUTPATIENT
Start: 2020-06-24 | End: 2020-06-24

## 2020-06-24 RX ORDER — FLUTICASONE PROPIONATE 50 MCG
2 SPRAY, SUSPENSION (ML) NASAL DAILY
Qty: 18.2 ML | Refills: 11 | Status: SHIPPED | OUTPATIENT
Start: 2020-06-24 | End: 2021-04-20 | Stop reason: SDUPTHER

## 2020-06-24 RX ORDER — NAPROXEN 500 MG/1
500 TABLET ORAL 2 TIMES DAILY PRN
Qty: 60 TABLET | Refills: 5 | Status: SHIPPED | OUTPATIENT
Start: 2020-06-24 | End: 2022-02-09

## 2020-06-24 RX ORDER — ALBUTEROL SULFATE 90 UG/1
2 AEROSOL, METERED RESPIRATORY (INHALATION) EVERY 4 HOURS PRN
Qty: 6.7 G | Refills: 11 | Status: SHIPPED | OUTPATIENT
Start: 2020-06-24

## 2020-06-24 RX ORDER — CETIRIZINE HYDROCHLORIDE 10 MG/1
10 TABLET ORAL DAILY
Qty: 90 TABLET | Refills: 3 | Status: SHIPPED | OUTPATIENT
Start: 2020-06-24 | End: 2021-04-20 | Stop reason: SDUPTHER

## 2020-06-24 RX ADMIN — KETOROLAC TROMETHAMINE 60 MG: 30 INJECTION, SOLUTION INTRAMUSCULAR; INTRAVENOUS at 17:07

## 2020-06-24 NOTE — PROGRESS NOTES
Subjective   Dragan Shultz is a 50 y.o. male.     History of Present Illness   Pt presents for follow up for hyperlipidemia, asthma and prediabetes. Been over one year since seen in office   Son notes he took cholesterol medication for awhile, but stopped when he ran out of refills. Due for fasting labs   Breathing has been doing okay. Needs refill on albuterol and breo. 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 XR last year was normal. Wearing wrist brace. R hand dominant  Also having issues with lateral elbow pain. Tennis elbow brace has not been helping. Wearing a compression sleeve and that has helped   Wanting stress test to check on heart. Does note some dyspnea with exertion at times   Also wants full lab panel     The following portions of the patient's history were reviewed and updated as appropriate: allergies, current medications, past family history, past medical history, past social history, past surgical history and problem list.    Review of Systems   Constitutional: Negative.  Negative for chills, diaphoresis, fatigue and fever.   HENT: Negative.  Negative for congestion, ear discharge, ear pain, hearing loss, nosebleeds, postnasal drip, sinus pressure, sneezing and sore throat.    Eyes: Negative.    Respiratory: Negative.  Negative for cough, chest tightness, shortness of breath and wheezing.    Cardiovascular: Negative.  Negative for chest pain, palpitations and leg swelling.   Gastrointestinal: Negative for abdominal distention, abdominal pain, blood in stool, constipation, diarrhea, nausea and vomiting.   Genitourinary: Negative.  Negative for difficulty urinating, dysuria, flank pain, frequency, hematuria and urgency.   Musculoskeletal: Positive for arthralgias. Negative for back  pain, gait problem, joint swelling, myalgias, neck pain and neck stiffness.   Skin: Negative.  Negative for color change, pallor, rash and wound.   Allergic/Immunologic: Positive for environmental allergies.   Neurological: Negative for dizziness, syncope, weakness, light-headedness, numbness and headaches.       Objective   Physical Exam   Constitutional: He is oriented to person, place, and time. He appears well-developed and well-nourished.   HENT:   Head: Normocephalic and atraumatic.   Right Ear: Tympanic membrane, external ear and ear canal normal.   Left Ear: Tympanic membrane, external ear and ear canal normal.   Nose: Nose normal.   Mouth/Throat: Oropharynx is clear and moist. No oropharyngeal exudate.   Eyes: Conjunctivae are normal.   Neck: Normal range of motion. Neck supple. No tracheal deviation present. No thyromegaly present.   Cardiovascular: Normal rate, regular rhythm, normal heart sounds and intact distal pulses.   Pulmonary/Chest: Effort normal and breath sounds normal. No respiratory distress. He has no wheezes. He has no rales. He exhibits no tenderness.   Abdominal: Soft. Bowel sounds are normal. He exhibits no distension and no mass. There is no tenderness. There is no rebound and no guarding. No hernia.   Musculoskeletal: Normal range of motion. He exhibits no edema or deformity.        Left elbow: He exhibits normal range of motion, no swelling, no effusion and no deformity. Tenderness found. Lateral epicondyle tenderness noted.        Right wrist: He exhibits tenderness. He exhibits normal range of motion, no bony tenderness and no swelling.   Lymphadenopathy:     He has no cervical adenopathy.   Neurological: He is alert and oriented to person, place, and time.   Skin: Skin is warm and dry.   Psychiatric: He has a normal mood and affect. His behavior is normal. Judgment and thought content normal.   Nursing note and vitals reviewed.      Assessment/Plan   Dragan was seen today for  hyperlipidemia, wrist pain and joint pain.    Diagnoses and all orders for this visit:    Moderate persistent asthma, unspecified whether complicated  -     Fluticasone Furoate-Vilanterol (BREO ELLIPTA) 100-25 MCG/INH inhaler; Inhale 1 puff Daily.  -     albuterol sulfate  (90 Base) MCG/ACT inhaler; Inhale 2 puffs Every 4 (Four) Hours As Needed for Wheezing.    Mixed hyperlipidemia  -     CBC & Differential  -     Comprehensive Metabolic Panel  -     Lipid Panel    Pre-diabetes  -     CBC & Differential  -     Comprehensive Metabolic Panel  -     Hemoglobin A1c    Need for hepatitis C screening test  -     Hepatitis C Antibody    Screening for prostate cancer  -     PSA SCREENING    Vitamin D deficiency  -     Vitamin D 25 Hydroxy    Low vitamin B12 level  -     Vitamin B12    Low iron  -     Iron Profile    Right wrist tendinitis  -     ketorolac (TORADOL) injection 60 mg  -     naproxen (Naprosyn) 500 MG tablet; Take 1 tablet by mouth 2 (Two) Times a Day As Needed for Mild Pain  or Moderate Pain . Take with meals    Left tennis elbow  -     ketorolac (TORADOL) injection 60 mg  -     naproxen (Naprosyn) 500 MG tablet; Take 1 tablet by mouth 2 (Two) Times a Day As Needed for Mild Pain  or Moderate Pain . Take with meals    Seasonal allergies  -     fluticasone (Flonase) 50 MCG/ACT nasal spray; 2 sprays by Each Nare route Daily.  -     cetirizine (zyrTEC) 10 MG tablet; Take 1 tablet by mouth Daily.    Dyspnea on exertion  -     Treadmill Stress Test      Suspect likely tendinitis of wrist and elbow due to overuse. Toradol injection in office to help with pain. May take naproxen for maintenance as needed in the next couple of days. Make sure to rest and ice regularly. May continue with compression sleeve and wrist brace   Check labs as noted.   Will order stress test per patients request    Refills as noted

## 2020-06-29 ENCOUNTER — LAB (OUTPATIENT)
Dept: FAMILY MEDICINE CLINIC | Facility: CLINIC | Age: 50
End: 2020-06-29

## 2020-06-30 LAB
25(OH)D3+25(OH)D2 SERPL-MCNC: 13.8 NG/ML (ref 30–100)
ALBUMIN SERPL-MCNC: 4.3 G/DL (ref 4–5)
ALBUMIN/GLOB SERPL: 1.6 {RATIO} (ref 1.2–2.2)
ALP SERPL-CCNC: 49 IU/L (ref 39–117)
ALT SERPL-CCNC: 16 IU/L (ref 0–44)
AST SERPL-CCNC: 14 IU/L (ref 0–40)
BASOPHILS # BLD AUTO: 0 X10E3/UL (ref 0–0.2)
BASOPHILS NFR BLD AUTO: 1 %
BILIRUB SERPL-MCNC: 0.5 MG/DL (ref 0–1.2)
BUN SERPL-MCNC: 11 MG/DL (ref 6–24)
BUN/CREAT SERPL: 13 (ref 9–20)
CALCIUM SERPL-MCNC: 9.2 MG/DL (ref 8.7–10.2)
CHLORIDE SERPL-SCNC: 103 MMOL/L (ref 96–106)
CHOLEST SERPL-MCNC: 244 MG/DL (ref 100–199)
CO2 SERPL-SCNC: 24 MMOL/L (ref 20–29)
CREAT SERPL-MCNC: 0.87 MG/DL (ref 0.76–1.27)
EOSINOPHIL # BLD AUTO: 0.3 X10E3/UL (ref 0–0.4)
EOSINOPHIL NFR BLD AUTO: 6 %
ERYTHROCYTE [DISTWIDTH] IN BLOOD BY AUTOMATED COUNT: 13.8 % (ref 11.6–15.4)
GLOBULIN SER CALC-MCNC: 2.7 G/DL (ref 1.5–4.5)
GLUCOSE SERPL-MCNC: 121 MG/DL (ref 65–99)
HBA1C MFR BLD: 6.6 % (ref 4.8–5.6)
HCT VFR BLD AUTO: 47.7 % (ref 37.5–51)
HCV AB S/CO SERPL IA: <0.1 S/CO RATIO (ref 0–0.9)
HDLC SERPL-MCNC: 48 MG/DL
HGB BLD-MCNC: 15 G/DL (ref 13–17.7)
IMM GRANULOCYTES # BLD AUTO: 0 X10E3/UL (ref 0–0.1)
IMM GRANULOCYTES NFR BLD AUTO: 0 %
IRON SATN MFR SERPL: 35 % (ref 15–55)
IRON SERPL-MCNC: 104 UG/DL (ref 38–169)
LDLC SERPL CALC-MCNC: 162 MG/DL (ref 0–99)
LYMPHOCYTES # BLD AUTO: 1.6 X10E3/UL (ref 0.7–3.1)
LYMPHOCYTES NFR BLD AUTO: 33 %
MCH RBC QN AUTO: 27.7 PG (ref 26.6–33)
MCHC RBC AUTO-ENTMCNC: 31.4 G/DL (ref 31.5–35.7)
MCV RBC AUTO: 88 FL (ref 79–97)
MONOCYTES # BLD AUTO: 0.6 X10E3/UL (ref 0.1–0.9)
MONOCYTES NFR BLD AUTO: 12 %
NEUTROPHILS # BLD AUTO: 2.2 X10E3/UL (ref 1.4–7)
NEUTROPHILS NFR BLD AUTO: 48 %
PLATELET # BLD AUTO: 188 X10E3/UL (ref 150–450)
POTASSIUM SERPL-SCNC: 4.5 MMOL/L (ref 3.5–5.2)
PROT SERPL-MCNC: 7 G/DL (ref 6–8.5)
PSA SERPL-MCNC: 0.5 NG/ML (ref 0–4)
RBC # BLD AUTO: 5.42 X10E6/UL (ref 4.14–5.8)
SODIUM SERPL-SCNC: 138 MMOL/L (ref 134–144)
TIBC SERPL-MCNC: 295 UG/DL (ref 250–450)
TRIGL SERPL-MCNC: 168 MG/DL (ref 0–149)
UIBC SERPL-MCNC: 191 UG/DL (ref 111–343)
VIT B12 SERPL-MCNC: 242 PG/ML (ref 232–1245)
VLDLC SERPL CALC-MCNC: 34 MG/DL (ref 5–40)
WBC # BLD AUTO: 4.7 X10E3/UL (ref 3.4–10.8)

## 2020-07-07 DIAGNOSIS — E78.2 MIXED HYPERLIPIDEMIA: Primary | ICD-10-CM

## 2020-07-07 RX ORDER — ROSUVASTATIN CALCIUM 10 MG/1
10 TABLET, COATED ORAL DAILY
Qty: 90 TABLET | Refills: 1 | Status: SHIPPED | OUTPATIENT
Start: 2020-07-07 | End: 2020-08-12 | Stop reason: SDUPTHER

## 2020-08-04 ENCOUNTER — HOSPITAL ENCOUNTER (OUTPATIENT)
Dept: CARDIOLOGY | Facility: HOSPITAL | Age: 50
Discharge: HOME | End: 2020-08-04
Admitting: PHYSICIAN ASSISTANT

## 2020-08-04 VITALS
HEART RATE: 71 BPM | RESPIRATION RATE: 18 BRPM | OXYGEN SATURATION: 99 % | WEIGHT: 160 LBS | DIASTOLIC BLOOD PRESSURE: 78 MMHG | SYSTOLIC BLOOD PRESSURE: 108 MMHG | BODY MASS INDEX: 24.25 KG/M2 | HEIGHT: 68 IN

## 2020-08-04 LAB
BH CV STRESS BP STAGE 1: NORMAL
BH CV STRESS BP STAGE 2: NORMAL
BH CV STRESS BP STAGE 3: NORMAL
BH CV STRESS DURATION MIN STAGE 1: 3
BH CV STRESS DURATION MIN STAGE 2: 3
BH CV STRESS DURATION MIN STAGE 3: 2
BH CV STRESS DURATION SEC STAGE 3: 32
BH CV STRESS GRADE STAGE 1: 10
BH CV STRESS GRADE STAGE 2: 12
BH CV STRESS GRADE STAGE 3: 14
BH CV STRESS HR STAGE 1: 117
BH CV STRESS HR STAGE 2: 134
BH CV STRESS HR STAGE 3: 181
BH CV STRESS METS STAGE 1: 5
BH CV STRESS METS STAGE 2: 7.5
BH CV STRESS METS STAGE 3: 10
BH CV STRESS O2 STAGE 1: 99
BH CV STRESS O2 STAGE 2: 98
BH CV STRESS O2 STAGE 3: 98
BH CV STRESS PROTOCOL 1: NORMAL
BH CV STRESS RECOVERY BP: NORMAL MMHG
BH CV STRESS RECOVERY HR: 92 BPM
BH CV STRESS SPEED STAGE 1: 1.7
BH CV STRESS SPEED STAGE 2: 2.5
BH CV STRESS SPEED STAGE 3: 3.4
BH CV STRESS STAGE 1: 1
BH CV STRESS STAGE 2: 2
BH CV STRESS STAGE 3: 3
MAXIMAL PREDICTED HEART RATE: 170 BPM
PERCENT MAX PREDICTED HR: 106.47 %
STRESS BASELINE BP: NORMAL MMHG
STRESS BASELINE HR: 80 BPM
STRESS O2 SAT REST: 99 %
STRESS PERCENT HR: 125 %
STRESS POST ESTIMATED WORKLOAD: 10.1 METS
STRESS POST EXERCISE DUR MIN: 8 MIN
STRESS POST EXERCISE DUR SEC: 32 SEC
STRESS POST O2 SAT PEAK: 98 %
STRESS POST PEAK BP: NORMAL MMHG
STRESS POST PEAK HR: 181 BPM
STRESS TARGET HR: 145 BPM

## 2020-08-04 PROCEDURE — 93017 CV STRESS TEST TRACING ONLY: CPT

## 2020-08-04 PROCEDURE — 93018 CV STRESS TEST I&R ONLY: CPT | Performed by: INTERNAL MEDICINE

## 2020-08-12 DIAGNOSIS — E78.2 MIXED HYPERLIPIDEMIA: ICD-10-CM

## 2020-08-12 RX ORDER — ROSUVASTATIN CALCIUM 10 MG/1
10 TABLET, COATED ORAL DAILY
Qty: 90 TABLET | Refills: 1 | Status: SHIPPED | OUTPATIENT
Start: 2020-08-12 | End: 2021-04-20

## 2021-02-08 ENCOUNTER — TELEPHONE (OUTPATIENT)
Dept: FAMILY MEDICINE CLINIC | Facility: CLINIC | Age: 51
End: 2021-02-08

## 2021-02-08 NOTE — TELEPHONE ENCOUNTER
PTS SON CALLED REQUESTING A SCRIPT FOR GLUCOSE TEST STRIPS    [address] - [address] [facility] - [phone]  - [phone] FX

## 2021-02-08 NOTE — TELEPHONE ENCOUNTER
Pt's son said, pt will need an Rx for a new meter (insurnace preference), test strips (test once daily) and lancets too.

## 2021-04-20 ENCOUNTER — OFFICE VISIT (OUTPATIENT)
Dept: FAMILY MEDICINE CLINIC | Facility: CLINIC | Age: 51
End: 2021-04-20

## 2021-04-20 VITALS
WEIGHT: 161 LBS | TEMPERATURE: 98 F | HEIGHT: 68 IN | DIASTOLIC BLOOD PRESSURE: 74 MMHG | SYSTOLIC BLOOD PRESSURE: 116 MMHG | HEART RATE: 76 BPM | BODY MASS INDEX: 24.4 KG/M2 | RESPIRATION RATE: 18 BRPM

## 2021-04-20 DIAGNOSIS — G89.29 CHRONIC PAIN OF RIGHT WRIST: ICD-10-CM

## 2021-04-20 DIAGNOSIS — J30.2 SEASONAL ALLERGIES: ICD-10-CM

## 2021-04-20 DIAGNOSIS — R73.03 PREDIABETES: ICD-10-CM

## 2021-04-20 DIAGNOSIS — E78.2 MIXED HYPERLIPIDEMIA: ICD-10-CM

## 2021-04-20 DIAGNOSIS — Z00.00 ENCOUNTER FOR WELL ADULT EXAM WITHOUT ABNORMAL FINDINGS: Primary | ICD-10-CM

## 2021-04-20 DIAGNOSIS — M25.531 CHRONIC PAIN OF RIGHT WRIST: ICD-10-CM

## 2021-04-20 DIAGNOSIS — E55.9 VITAMIN D DEFICIENCY: ICD-10-CM

## 2021-04-20 DIAGNOSIS — Z12.11 SCREENING FOR MALIGNANT NEOPLASM OF COLON: ICD-10-CM

## 2021-04-20 DIAGNOSIS — E53.8 LOW SERUM VITAMIN B12: ICD-10-CM

## 2021-04-20 PROCEDURE — 99396 PREV VISIT EST AGE 40-64: CPT | Performed by: PHYSICIAN ASSISTANT

## 2021-04-20 RX ORDER — CETIRIZINE HYDROCHLORIDE 10 MG/1
10 TABLET ORAL DAILY
Qty: 90 TABLET | Refills: 3 | Status: SHIPPED | OUTPATIENT
Start: 2021-04-20

## 2021-04-20 RX ORDER — FLUTICASONE PROPIONATE 50 MCG
2 SPRAY, SUSPENSION (ML) NASAL DAILY
Qty: 18.2 ML | Refills: 11 | Status: SHIPPED | OUTPATIENT
Start: 2021-04-20 | End: 2022-05-27

## 2021-04-20 NOTE — PROGRESS NOTES
Chief Complaint   Patient presents with   • Annual Exam       Subjective   The patient is a 51 y.o. male who presents for annual exam      Nutrition:  well balanced. Working on eating more salads. Fruits and vegetables. 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 Low risk. No known family hx     Past Medical History,Medications, Allergies reviewed.     HPI  Working on nutrition to avoid going on diabetes medication   Stopped statin medication 6 months ago.   Really does not want to be on medications if he can prevent it   Still with R wrist pain. Normal XR in 2019. Denies numbness or tingling in fingers. 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 Wears a brace every day   R hand dominant   Was having tennis elbow on L side, but this is better since using a copper sleeve     Not received COVID vaccine yet  Will think about tetanus booster     Having hacking cough and allergies again. Requesting refill on allergy medication  Not needing inhalers right now     Review of Systems   Constitutional: Negative.  Negative for chills, diaphoresis, fatigue and fever.   HENT: Positive for congestion. Negative for ear discharge, ear pain, hearing loss, nosebleeds, postnasal drip, sinus pressure, sneezing and sore throat.    Eyes: Negative.    Respiratory: Positive for cough. Negative for chest tightness, shortness of breath and wheezing.    Cardiovascular: Negative.  Negative for chest pain, palpitations and leg swelling.   Gastrointestinal: Negative for abdominal distention, abdominal pain, blood in stool, constipation, diarrhea, nausea and vomiting.   Genitourinary: Negative.  Negative for difficulty urinating, dysuria, flank pain, frequency, hematuria and urgency.   Musculoskeletal: Positive for arthralgias. Negative for back pain, gait problem, joint swelling, myalgias,  "neck pain and neck stiffness.   Skin: Negative.  Negative for color change, pallor, rash and wound.   Allergic/Immunologic: Positive for environmental allergies.   Neurological: Negative for dizziness, syncope, weakness, light-headedness, numbness and headaches.       Objective     /74   Pulse 76   Temp 98 °F (36.7 °C)   Resp 18   Ht 172 cm (67.72\")   Wt 73 kg (161 lb)   BMI 24.68 kg/m²     Physical Exam  Vitals and nursing note reviewed.   Constitutional:       Appearance: Normal appearance. He is well-developed.   HENT:      Head: Normocephalic and atraumatic.      Right Ear: External ear normal.      Left Ear: External ear normal.      Nose: Nose normal.   Eyes:      Conjunctiva/sclera: Conjunctivae normal.   Neck:      Thyroid: No thyromegaly.      Trachea: No tracheal deviation.   Cardiovascular:      Rate and Rhythm: Normal rate and regular rhythm.      Heart sounds: Normal heart sounds.   Pulmonary:      Effort: Pulmonary effort is normal. No respiratory distress.      Breath sounds: Normal breath sounds. No wheezing or rales.   Chest:      Chest wall: No tenderness.   Abdominal:      General: Bowel sounds are normal. There is no distension.      Palpations: Abdomen is soft. There is no mass.      Tenderness: There is no abdominal tenderness. There is no guarding or rebound.      Hernia: No hernia is present.   Musculoskeletal:      Right wrist: Tenderness and bony tenderness present. No swelling. Normal range of motion.      Cervical back: Normal range of motion and neck supple.   Lymphadenopathy:      Cervical: No cervical adenopathy.   Skin:     General: Skin is warm and dry.   Neurological:      Mental Status: He is alert and oriented to person, place, and time.   Psychiatric:         Behavior: Behavior normal.         Thought Content: Thought content normal.         Judgment: Judgment normal.         Assessment/Plan     1. Encounter for well adult exam without abnormal findings  - CBC w AUTO " Differential  - Comprehensive metabolic panel  - Lipid panel  - Hemoglobin A1c  - Vitamin B12  - Iron and TIBC  - Vitamin D 25 hydroxy  - TSH  - Cologuard - Stool, Per Rectum; Future    2. Vitamin D deficiency  - Vitamin D 25 hydroxy    3. Low serum vitamin B12  - CBC w AUTO Differential  - Vitamin B12    4. Prediabetes  - CBC w AUTO Differential  - Comprehensive metabolic panel  - Hemoglobin A1c    5. Mixed hyperlipidemia  - CBC w AUTO Differential  - Comprehensive metabolic panel  - Lipid panel    6. Chronic pain of right wrist  - Ambulatory Referral to Orthopedic Surgery    7. Seasonal allergies  - cetirizine (zyrTEC) 10 MG tablet; Take 1 tablet by mouth Daily.  Dispense: 90 tablet; Refill: 3  - fluticasone (Flonase) 50 MCG/ACT nasal spray; 2 sprays by Each Nare route Daily.  Dispense: 18.2 mL; Refill: 11    8. Screening for malignant neoplasm of colon  - Cologuard - Stool, Per Rectum; Future    Labs as outlined in plan   cologuard ordered with patient's consent   Refill on allergy medication   F/u pending lab results   Referral for ortho for ongoing R wrist pain.     JESSY Beard

## 2021-04-21 LAB
25(OH)D3+25(OH)D2 SERPL-MCNC: 17.4 NG/ML (ref 30–100)
ALBUMIN SERPL-MCNC: 4.4 G/DL (ref 3.5–5.2)
ALBUMIN/GLOB SERPL: 1.8 G/DL
ALP SERPL-CCNC: 51 U/L (ref 39–117)
ALT SERPL-CCNC: 13 U/L (ref 1–41)
AST SERPL-CCNC: 13 U/L (ref 1–40)
BASOPHILS # BLD AUTO: 0.03 10*3/MM3 (ref 0–0.2)
BASOPHILS NFR BLD AUTO: 0.7 % (ref 0–1.5)
BILIRUB SERPL-MCNC: 0.3 MG/DL (ref 0–1.2)
BUN SERPL-MCNC: 7 MG/DL (ref 6–20)
BUN/CREAT SERPL: 9.1 (ref 7–25)
CALCIUM SERPL-MCNC: 9.5 MG/DL (ref 8.6–10.5)
CHLORIDE SERPL-SCNC: 104 MMOL/L (ref 98–107)
CHOLEST SERPL-MCNC: 249 MG/DL (ref 0–200)
CO2 SERPL-SCNC: 29.1 MMOL/L (ref 22–29)
CREAT SERPL-MCNC: 0.77 MG/DL (ref 0.76–1.27)
EOSINOPHIL # BLD AUTO: 0.36 10*3/MM3 (ref 0–0.4)
EOSINOPHIL NFR BLD AUTO: 8 % (ref 0.3–6.2)
ERYTHROCYTE [DISTWIDTH] IN BLOOD BY AUTOMATED COUNT: 13.3 % (ref 12.3–15.4)
GLOBULIN SER CALC-MCNC: 2.4 GM/DL
GLUCOSE SERPL-MCNC: 105 MG/DL (ref 65–99)
HBA1C MFR BLD: 6.2 % (ref 4.8–5.6)
HCT VFR BLD AUTO: 45.9 % (ref 37.5–51)
HDLC SERPL-MCNC: 47 MG/DL (ref 40–60)
HGB BLD-MCNC: 14.9 G/DL (ref 13–17.7)
IMM GRANULOCYTES # BLD AUTO: 0.02 10*3/MM3 (ref 0–0.05)
IMM GRANULOCYTES NFR BLD AUTO: 0.4 % (ref 0–0.5)
IRON SATN MFR SERPL: 22 % (ref 20–50)
IRON SERPL-MCNC: 71 MCG/DL (ref 59–158)
LDLC SERPL CALC-MCNC: 171 MG/DL (ref 0–100)
LYMPHOCYTES # BLD AUTO: 1.82 10*3/MM3 (ref 0.7–3.1)
LYMPHOCYTES NFR BLD AUTO: 40.4 % (ref 19.6–45.3)
MCH RBC QN AUTO: 27.4 PG (ref 26.6–33)
MCHC RBC AUTO-ENTMCNC: 32.5 G/DL (ref 31.5–35.7)
MCV RBC AUTO: 84.5 FL (ref 79–97)
MONOCYTES # BLD AUTO: 0.44 10*3/MM3 (ref 0.1–0.9)
MONOCYTES NFR BLD AUTO: 9.8 % (ref 5–12)
NEUTROPHILS # BLD AUTO: 1.83 10*3/MM3 (ref 1.7–7)
NEUTROPHILS NFR BLD AUTO: 40.7 % (ref 42.7–76)
NRBC BLD AUTO-RTO: 0 /100 WBC (ref 0–0.2)
PLATELET # BLD AUTO: 186 10*3/MM3 (ref 140–450)
POTASSIUM SERPL-SCNC: 4.7 MMOL/L (ref 3.5–5.2)
PROT SERPL-MCNC: 6.8 G/DL (ref 6–8.5)
RBC # BLD AUTO: 5.43 10*6/MM3 (ref 4.14–5.8)
SODIUM SERPL-SCNC: 140 MMOL/L (ref 136–145)
TIBC SERPL-MCNC: 315 MCG/DL
TRIGL SERPL-MCNC: 166 MG/DL (ref 0–150)
TSH SERPL DL<=0.005 MIU/L-ACNC: 1.51 UIU/ML (ref 0.27–4.2)
UIBC SERPL-MCNC: 244 MCG/DL (ref 112–346)
VIT B12 SERPL-MCNC: 227 PG/ML (ref 211–946)
VLDLC SERPL CALC-MCNC: 31 MG/DL (ref 5–40)
WBC # BLD AUTO: 4.5 10*3/MM3 (ref 3.4–10.8)

## 2021-04-22 DIAGNOSIS — E78.00 PURE HYPERCHOLESTEROLEMIA: Primary | ICD-10-CM

## 2021-04-22 RX ORDER — ROSUVASTATIN CALCIUM 10 MG/1
10 TABLET, COATED ORAL DAILY
Qty: 90 TABLET | Refills: 3 | Status: SHIPPED | OUTPATIENT
Start: 2021-04-22 | End: 2022-02-09 | Stop reason: SDUPTHER

## 2021-04-23 ENCOUNTER — TELEPHONE (OUTPATIENT)
Dept: ORTHOPEDIC SURGERY | Facility: CLINIC | Age: 51
End: 2021-04-23

## 2021-04-23 NOTE — TELEPHONE ENCOUNTER
Caller: Radha Shultz    Relationship to patient: Emergency Contact    Best call back number: [phone]    Patient is needing: PATIENT IS SCHEDULED FOR 5/4/21 AT 2:40. PATIENT DOES NOT SPEAK ENGLISH, SON RDAHA WILL TRANSLATE. RADHA CAN BE CONTACTED AT THE NUMBER ABOVE IF ANY QUESTIONS.

## 2021-07-08 ENCOUNTER — OFFICE VISIT (OUTPATIENT)
Dept: ORTHOPEDIC SURGERY | Facility: CLINIC | Age: 51
End: 2021-07-08

## 2021-07-08 VITALS
SYSTOLIC BLOOD PRESSURE: 101 MMHG | HEART RATE: 80 BPM | BODY MASS INDEX: 23.64 KG/M2 | DIASTOLIC BLOOD PRESSURE: 72 MMHG | WEIGHT: 156 LBS | HEIGHT: 68 IN

## 2021-07-08 DIAGNOSIS — M25.531 RIGHT WRIST PAIN: Primary | ICD-10-CM

## 2021-07-08 DIAGNOSIS — M25.532 LEFT WRIST PAIN: ICD-10-CM

## 2021-07-08 DIAGNOSIS — M25.522 LEFT ELBOW PAIN: ICD-10-CM

## 2021-07-08 PROCEDURE — 99214 OFFICE O/P EST MOD 30 MIN: CPT | Performed by: PHYSICIAN ASSISTANT

## 2021-07-08 RX ORDER — MELOXICAM 15 MG/1
15 TABLET ORAL DAILY
Qty: 30 TABLET | Refills: 2 | Status: SHIPPED | OUTPATIENT
Start: 2021-07-08 | End: 2022-02-09 | Stop reason: SDUPTHER

## 2021-07-08 NOTE — PROGRESS NOTES
[facility] [place] [facility] Note        Subjective     Pain of the Right Wrist, Pain of the Left Wrist, and Pain of the Left Elbow      HPI    Dragan Shultz is a 51 y.o. male.  This is a very pleasant RHD male presenting to discuss his bilateral wrist pain and left elbow pain.  His son is with him today to translate.  He reports he has been having pain for about a year.  He has pain with working.  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  He complains of pain that is worse with twisting.  It is 7/10 and aching.  He complains of stiffness.  He has treated with bracing.  He denies any numbness and tingling.  Here for further evaluation treatment recommendations.    Past Medical History:   Diagnosis Date   • Hyperlipidemia    • Pre-diabetes       History reviewed. No pertinent surgical history.   Family History   Problem Relation Age of Onset   • Diabetes Paternal Grandfather    • Hypertension Paternal Grandfather    • Hyperlipidemia Paternal Grandfather    • Stroke Paternal Grandfather    • Diabetes Father    • Stroke Father      Social History     Socioeconomic History   • Marital status:      Spouse name: Not on file   • Number of children: Not on file   • Years of education: Not on file   • Highest education level: Not on file   Tobacco Use   • Smoking status: Never Smoker   • Smokeless tobacco: Never Used   Substance and Sexual Activity   • Alcohol use: No   • Drug use: No   • Sexual activity: Defer      Current Outpatient Medications on File Prior to Visit   Medication Sig Dispense Refill   • albuterol sulfate  (90 Base) MCG/ACT inhaler Inhale 2 puffs Every 4 (Four) Hours As Needed for Wheezing. 6.7 g 11   • cetirizine (zyrTEC) 10 MG tablet Take 1 tablet by mouth Daily. 90 tablet 3   • fluticasone (Flonase) 50 MCG/ACT nasal spray 2 sprays by Each Nare route Daily. 18.2 mL 11   • Fluticasone  "Furoate-Vilanterol (BREO ELLIPTA) 100-25 MCG/INH inhaler Inhale 1 puff Daily. 1 each 11   • naproxen (Naprosyn) 500 MG tablet Take 1 tablet by mouth 2 (Two) Times a Day As Needed for Mild Pain  or Moderate Pain . Take with meals 60 tablet 5   • rosuvastatin (Crestor) 10 MG tablet Take 1 tablet by mouth Daily. 90 tablet 3     No current facility-administered medications on file prior to visit.      No Known Allergies       Review of Systems   Constitutional: Negative.    HENT: Negative.    Eyes: Negative.    Respiratory: Negative.    Cardiovascular: Negative.    Gastrointestinal: Negative.    Endocrine: Negative.    Genitourinary: Negative.    Musculoskeletal: Positive for arthralgias.   Skin: Negative.    Allergic/Immunologic: Negative.    Neurological: Negative.    Hematological: Negative.    Psychiatric/Behavioral: Negative.         I reviewed the patient's chief complaint, history of present illness, review of systems, past medical history, surgical history, family history, social history, medications and allergy list.        Objective      Physical Exam  /72   Pulse 80   Ht 172 cm (67.72\")   Wt 70.8 kg (156 lb)   BMI 23.92 kg/m²     Body mass index is 23.92 kg/m².    General  Mental Status - alert  General Appearance - cooperative, well groomed, not in acute distress  Orientation - Oriented X3  Build & Nutrition - well developed and well nourished  Posture - normal posture  Gait - normal gait       Ortho Exam  Peripheral Vascular   Bilateral Upper Extremity    No cyanotic nail beds    Pink nail beds and rapid capillary refill   Palpation    Radial Pulse - Bilaterally normal    Neurologic   Sensory: Light touch intact- Right and left hand    Left Upper Extremity    Left wrist extensors: 5/5    Left wrist flexors: 5/5    Left intrinsics: 5/5   Right Upper Extremity    Right wrist extensors: 5/5    Right wrist flexors: 5/5    Right intrinsics: 5/5    Musculoskeletal   Left Elbow    Forearm supination: " AROM - 90 degrees    Forearm pronation: AROM - 90 degrees    Nontender with mild tenderness over the extensor muscles of the forearm.   Right Elbow    Forearm supination: AROM - 90 degrees    Forearm pronation: AROM - 90 degrees     Inspection and Palpation   Right Wrist      Tenderness -mildly tender over the volar or radial carpal joint.    Swelling - none    Crepitus - none    Muscle tone - no atrophy   Left Wrist    Tenderness -mildly tender over the volar radiocarpal joint.    Swelling - none    Crepitus - none    Muscle tone - no atrophy     ROJM:   Left Wrist    Flexion: AROM - 90 degrees    Extension: AROM - 90 degrees   Right Wrist    Flexion: AROM - 90 degrees    Extension: AROM - 90 degrees     Deformities, Malalignments, Discrepancies    None     Functional Testing   Right Wrist    Tinel's Sign negative    Phalen's Sign negative    Carpal Compression Test negative   Left Wrist    Tinel's Sign negative    Phalen's Sign negative    Carpal Compression Test negative       Strength and Tone    Right  strength: good    Left  strength: good     Hand Exam:    Full range of motion of the thumb MCPJ and IPJ bilaterally    Full range of motion of the index finger MCPJ, PIPJ and DIPJ  bilaterally    Full range of motion of the middle finger MCPJ, PIPJ and DIPJ bilaterally    Full range of motion of the ring finger MCPJ, PIPJ and DIPJ bilaterally    Full range of motion of the small finger MCPJ, PIPJ and DIPJ bialterally    FDS, FDP and extensor tendons are intact in the index, middle, ring and small fingers bilaterally    FPJ and extensor tendons are intact in the thumb.    No palpable triggering                  Assessment    Assessment:  1. Right wrist pain    2. Left elbow pain    3. Left wrist pain        Plan:  1. Continue over-the-counter medication as needed for discomfort  2. Bilateral wrist pain with left elbow pain.  I reviewed today's x-rays and clinical findings with the patient and his son.   X-rays show mild arthritic changes with no evidence of acute or chronic bony findings.  Patient reports he has had pain for approximately a year.  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  I suspect this is an overuse tendinitis.  I do not see anything more worrisome.  Recommendation today is a round of physical therapy and oral regular anti-inflammatory.  I given a prescription for meloxicam.  I given them a prescription for PT and they may or may not proceed.  I will see him back in 6 to 8 weeks or sooner if needed.    History, diagnosis and treatment plan discussed with Dr. Sharpe.            Zina Rm PA-C  07/09/21  12:30 EDT    Dragon disclaimer:  Much of this encounter note is an electronic transcription/translation of spoken language to printed text. The electronic translation of spoken language may permit erroneous, or at times, nonsensical words or phrases to be inadvertently transcribed; Although I have reviewed the note for such errors, some may still exist.

## 2022-02-09 ENCOUNTER — OFFICE VISIT (OUTPATIENT)
Dept: FAMILY MEDICINE CLINIC | Facility: CLINIC | Age: 52
End: 2022-02-09

## 2022-02-09 VITALS
HEIGHT: 68 IN | SYSTOLIC BLOOD PRESSURE: 118 MMHG | DIASTOLIC BLOOD PRESSURE: 72 MMHG | TEMPERATURE: 98.2 F | BODY MASS INDEX: 26.67 KG/M2 | RESPIRATION RATE: 18 BRPM | HEART RATE: 74 BPM | WEIGHT: 176 LBS

## 2022-02-09 DIAGNOSIS — E78.00 PURE HYPERCHOLESTEROLEMIA: ICD-10-CM

## 2022-02-09 DIAGNOSIS — Z51.81 MEDICATION MONITORING ENCOUNTER: ICD-10-CM

## 2022-02-09 DIAGNOSIS — M25.522 LEFT ELBOW PAIN: Primary | ICD-10-CM

## 2022-02-09 DIAGNOSIS — Z12.5 SCREENING FOR MALIGNANT NEOPLASM OF PROSTATE: ICD-10-CM

## 2022-02-09 DIAGNOSIS — M79.89 MASS OF SOFT TISSUE OF ELBOW: ICD-10-CM

## 2022-02-09 DIAGNOSIS — E53.8 LOW SERUM VITAMIN B12: ICD-10-CM

## 2022-02-09 DIAGNOSIS — R23.8 SKIN IRRITATION: ICD-10-CM

## 2022-02-09 DIAGNOSIS — E55.9 VITAMIN D DEFICIENCY: ICD-10-CM

## 2022-02-09 DIAGNOSIS — R73.03 PREDIABETES: ICD-10-CM

## 2022-02-09 DIAGNOSIS — Z13.29 SCREENING FOR THYROID DISORDER: ICD-10-CM

## 2022-02-09 LAB
25(OH)D3+25(OH)D2 SERPL-MCNC: 15.1 NG/ML (ref 30–100)
ALBUMIN SERPL-MCNC: 4.3 G/DL (ref 3.5–5.2)
ALBUMIN/GLOB SERPL: 1.7 G/DL
ALP SERPL-CCNC: 50 U/L (ref 39–117)
ALT SERPL-CCNC: 16 U/L (ref 1–41)
AST SERPL-CCNC: 12 U/L (ref 1–40)
BASOPHILS # BLD AUTO: 0.04 10*3/MM3 (ref 0–0.2)
BASOPHILS NFR BLD AUTO: 0.7 % (ref 0–1.5)
BILIRUB SERPL-MCNC: 0.2 MG/DL (ref 0–1.2)
BUN SERPL-MCNC: 15 MG/DL (ref 6–20)
BUN/CREAT SERPL: 18.5 (ref 7–25)
CALCIUM SERPL-MCNC: 9.4 MG/DL (ref 8.6–10.5)
CHLORIDE SERPL-SCNC: 105 MMOL/L (ref 98–107)
CHOLEST SERPL-MCNC: 273 MG/DL (ref 0–200)
CO2 SERPL-SCNC: 27.1 MMOL/L (ref 22–29)
CREAT SERPL-MCNC: 0.81 MG/DL (ref 0.76–1.27)
EOSINOPHIL # BLD AUTO: 0.55 10*3/MM3 (ref 0–0.4)
EOSINOPHIL NFR BLD AUTO: 10.2 % (ref 0.3–6.2)
ERYTHROCYTE [DISTWIDTH] IN BLOOD BY AUTOMATED COUNT: 13.3 % (ref 12.3–15.4)
GLOBULIN SER CALC-MCNC: 2.5 GM/DL
GLUCOSE SERPL-MCNC: 122 MG/DL (ref 65–99)
HBA1C MFR BLD: 6.8 % (ref 4.8–5.6)
HCT VFR BLD AUTO: 46 % (ref 37.5–51)
HDLC SERPL-MCNC: 50 MG/DL (ref 40–60)
HGB BLD-MCNC: 15.2 G/DL (ref 13–17.7)
IMM GRANULOCYTES # BLD AUTO: 0.04 10*3/MM3 (ref 0–0.05)
IMM GRANULOCYTES NFR BLD AUTO: 0.7 % (ref 0–0.5)
LDLC SERPL CALC-MCNC: 185 MG/DL (ref 0–100)
LYMPHOCYTES # BLD AUTO: 2.05 10*3/MM3 (ref 0.7–3.1)
LYMPHOCYTES NFR BLD AUTO: 38 % (ref 19.6–45.3)
MAGNESIUM SERPL-MCNC: 2 MG/DL (ref 1.6–2.6)
MCH RBC QN AUTO: 28 PG (ref 26.6–33)
MCHC RBC AUTO-ENTMCNC: 33 G/DL (ref 31.5–35.7)
MCV RBC AUTO: 84.9 FL (ref 79–97)
MONOCYTES # BLD AUTO: 0.65 10*3/MM3 (ref 0.1–0.9)
MONOCYTES NFR BLD AUTO: 12.1 % (ref 5–12)
NEUTROPHILS # BLD AUTO: 2.06 10*3/MM3 (ref 1.7–7)
NEUTROPHILS NFR BLD AUTO: 38.3 % (ref 42.7–76)
NRBC BLD AUTO-RTO: 0 /100 WBC (ref 0–0.2)
PLATELET # BLD AUTO: 183 10*3/MM3 (ref 140–450)
POTASSIUM SERPL-SCNC: 4.6 MMOL/L (ref 3.5–5.2)
PROT SERPL-MCNC: 6.8 G/DL (ref 6–8.5)
PSA SERPL-MCNC: 0.55 NG/ML (ref 0–4)
RBC # BLD AUTO: 5.42 10*6/MM3 (ref 4.14–5.8)
SODIUM SERPL-SCNC: 141 MMOL/L (ref 136–145)
TRIGL SERPL-MCNC: 201 MG/DL (ref 0–150)
TSH SERPL DL<=0.005 MIU/L-ACNC: 1.55 UIU/ML (ref 0.27–4.2)
VIT B12 SERPL-MCNC: 1152 PG/ML (ref 211–946)
VLDLC SERPL CALC-MCNC: 38 MG/DL (ref 5–40)
WBC # BLD AUTO: 5.39 10*3/MM3 (ref 3.4–10.8)

## 2022-02-09 PROCEDURE — 99214 OFFICE O/P EST MOD 30 MIN: CPT | Performed by: PHYSICIAN ASSISTANT

## 2022-02-09 RX ORDER — CLOTRIMAZOLE AND BETAMETHASONE DIPROPIONATE 10; .64 MG/G; MG/G
1 CREAM TOPICAL 2 TIMES DAILY
Qty: 30 G | Refills: 0 | Status: SHIPPED | OUTPATIENT
Start: 2022-02-09 | End: 2022-02-16

## 2022-02-09 RX ORDER — ROSUVASTATIN CALCIUM 10 MG/1
10 TABLET, COATED ORAL DAILY
Qty: 90 TABLET | Refills: 3 | Status: SHIPPED | OUTPATIENT
Start: 2022-02-09 | End: 2023-02-23

## 2022-02-09 RX ORDER — ERGOCALCIFEROL 1.25 MG/1
50000 CAPSULE ORAL WEEKLY
Qty: 5 CAPSULE | Refills: 5 | Status: SHIPPED | OUTPATIENT
Start: 2022-02-09

## 2022-02-09 RX ORDER — MELOXICAM 15 MG/1
15 TABLET ORAL DAILY
Qty: 30 TABLET | Refills: 2 | Status: SHIPPED | OUTPATIENT
Start: 2022-02-09 | End: 2022-05-24

## 2022-02-09 NOTE — PROGRESS NOTES
"Shauna Shultz is a 52 y.o. male.     History of Present Illness   F/u for left elbow pain   Recurrent issue   NSAIDs help some but pain returns   Has seen ortho last summer for this. No bony abnormality. Suspected tendinitis secondary to overuse. Was referred to PT and started on Meloxicam. Pt did home exercises, but no formal PT   When traveling to [facility] he started having issues with supination and pronation of L arm   Pain radiates to upper arm and forearm     Pt and son would like to check on annual labs while in office. Has not been taking cholesterol medicine after finishing bottle. Refills available at pharmacy. Also following prediabetes and vit D def. Has been taking 1000 units Vit D otc     Skin itching and irritation of scrotum     The following portions of the patient's history were reviewed and updated as appropriate: allergies, current medications, past family history, past medical history, past social history, past surgical history and problem list.    Review of Systems   Constitutional: Negative for chills and fever.   Respiratory: Negative for cough, shortness of breath and wheezing.    Cardiovascular: Negative for chest pain.   Gastrointestinal: Negative for diarrhea, nausea and vomiting.   Musculoskeletal: Positive for arthralgias and myalgias.   Skin: Negative for rash.       Objective    Blood pressure 118/72, pulse 74, temperature 98.2 °F (36.8 °C), resp. rate 18, height 172 cm (67.72\"), weight 79.8 kg (176 lb).     Physical Exam  Vitals and nursing note reviewed.   Constitutional:       Appearance: Normal appearance.   HENT:      Head: Normocephalic.      Right Ear: Tympanic membrane, ear canal and external ear normal.      Left Ear: Tympanic membrane, ear canal and external ear normal.      Nose: Nose normal.   Eyes:      Conjunctiva/sclera: Conjunctivae normal.   Cardiovascular:      Rate and Rhythm: Normal rate and regular rhythm.   Pulmonary:      Effort: Pulmonary " effort is normal. No respiratory distress.      Breath sounds: Normal breath sounds. No wheezing or rhonchi.   Abdominal:      Tenderness: There is no abdominal tenderness.   Musculoskeletal:      Left elbow: Tenderness present.      Comments: Localized mobile soft tissue mass with TTP    Skin:     General: Skin is warm and dry.   Neurological:      Mental Status: He is alert and oriented to person, place, and time.   Psychiatric:         Mood and Affect: Mood normal.         Behavior: Behavior normal.         Thought Content: Thought content normal.         Assessment/Plan   Diagnoses and all orders for this visit:    1. Left elbow pain (Primary)  -     meloxicam (MOBIC) 15 MG tablet; Take 1 tablet by mouth Daily.  Dispense: 30 tablet; Refill: 2  -     MRI elbow left w wo contrast; Future    2. Mass of soft tissue of elbow  -     MRI elbow left w wo contrast; Future    3. Skin irritation  -     clotrimazole-betamethasone (Lotrisone) 1-0.05 % cream; Apply 1 application topically to the appropriate area as directed 2 (Two) Times a Day for 7 days.  Dispense: 30 g; Refill: 0    4. Pure hypercholesterolemia  -     CBC w AUTO Differential  -     Comprehensive metabolic panel  -     Lipid Panel  -     rosuvastatin (Crestor) 10 MG tablet; Take 1 tablet by mouth Daily.  Dispense: 90 tablet; Refill: 3    5. Prediabetes  -     Comprehensive metabolic panel  -     Hemoglobin A1c    6. Vitamin D deficiency  -     Vitamin D 25 hydroxy  -     vitamin D (ERGOCALCIFEROL) 1.25 MG (81385 UT) capsule capsule; Take 1 capsule by mouth 1 (One) Time Per Week.  Dispense: 5 capsule; Refill: 5    7. Screening for malignant neoplasm of prostate  -     PSA Screen    8. Screening for thyroid disorder  -     TSH    9. Low serum vitamin B12  -     Vitamin B12    10. Medication monitoring encounter  -     Magnesium      Proceed with MRI of L elbow. meloxicam for pain and inflammation and encourage compression elbow sleeve   Labs as outlined in  plan

## 2022-03-14 ENCOUNTER — TELEPHONE (OUTPATIENT)
Dept: FAMILY MEDICINE CLINIC | Facility: CLINIC | Age: 52
End: 2022-03-14

## 2022-03-14 NOTE — TELEPHONE ENCOUNTER
Caller: Sanket Shultz    Relationship: Emergency Contact    Best call back number: [phone]    Caller requesting test results: YES    What test was performed: MRI     When was the test performed: FIRST WEEK OF MARCH    Where was the test performed: NA    Additional notes:

## 2022-03-16 NOTE — TELEPHONE ENCOUNTER
Still nothing in chart. Please update patient that we are working on this. Have already requested X 2. IRVINGG

## 2022-03-21 DIAGNOSIS — M25.522 CHRONIC ELBOW PAIN, LEFT: Primary | ICD-10-CM

## 2022-03-21 DIAGNOSIS — G89.29 CHRONIC ELBOW PAIN, LEFT: Primary | ICD-10-CM

## 2022-04-04 ENCOUNTER — TELEPHONE (OUTPATIENT)
Dept: ORTHOPEDIC SURGERY | Facility: CLINIC | Age: 52
End: 2022-04-04

## 2022-04-04 NOTE — TELEPHONE ENCOUNTER
Pt's son called in regards to this patients np appointment he has this coming Thursday 04/07/2022. He states that the patient has a flight that he has to catch at 2pm on that day and would like to know if the patient could come in early morning on 04/07/2022?    Please advise and call pt's son back at [phone].

## 2022-04-05 ENCOUNTER — OFFICE VISIT (OUTPATIENT)
Dept: ORTHOPEDIC SURGERY | Facility: CLINIC | Age: 52
End: 2022-04-05

## 2022-04-05 VITALS — SYSTOLIC BLOOD PRESSURE: 90 MMHG | DIASTOLIC BLOOD PRESSURE: 65 MMHG | HEIGHT: 68 IN | BODY MASS INDEX: 26.98 KG/M2

## 2022-04-05 DIAGNOSIS — M25.532 LEFT WRIST PAIN: ICD-10-CM

## 2022-04-05 DIAGNOSIS — S46.212A BICEPS STRAIN, LEFT, INITIAL ENCOUNTER: Primary | ICD-10-CM

## 2022-04-05 DIAGNOSIS — M25.522 LEFT ELBOW PAIN: ICD-10-CM

## 2022-04-05 PROCEDURE — 99214 OFFICE O/P EST MOD 30 MIN: CPT | Performed by: ORTHOPAEDIC SURGERY

## 2022-04-05 NOTE — PROGRESS NOTES
[facility] [place] Orthopaedic Surgery Office Visit - Leroy Sellers MD    Office Visit       Patient Name: Dragan Shultz    Chief Complaint:   Chief Complaint   Patient presents with   • Left Elbow - Pain       Referring Physician: No ref. provider found    History of Present Illness:   Dragan Shultz is a 52 y.o. male who presents with left body part: elbow Reason: pain.  Onset:Onset: atraumatic and gradual in nature. The issue has been ongoing for 1 year(s). Pain is a 7/10 on the pain scale. Pain is described as Pain Characterization: aching. Associated symptoms include Symptoms: pain. The pain is worse with any movement of the joint; ice and pain medication and/or NSAID improve the pain. Previous treatments have included: bracing and NSAIDS. I have reviewed the patient's history of present illness as noted/entered above.    I have reviewed the patient's past medical history, surgical history, social history, family history, medications, and allergies as noted in the electronic medical record and as noted/entered.  I have reviewed the patient's review of systems as noted/enter and updated as noted in the patient's HPI.      LEFT biceps, upper biceps, wrist    Meloxicam -- counseled on usage; currently taking prn  Icing    No pain over small mass; no lateral elbow pain today    He does not seem to notice the mass no lateral elbow pain he has generalized pain this been evaluated before the left wrist and left elbow.  He notes he had tennis elbow 2 years ago but the lateral pain appears to be gone.  He has no systemic symptoms with the mass no pain at the mass very small area    [facility] MRI Lx Dx 3/1/22 LEFT elbow with and without:  Left elbow with and without contrast a subcentimeter subcutaneous area of interest 0.9 x 0.6 x 0.8 ovoid increased T2 T1 in the subcutaneous fat incidentally noted biceps tendinitis which may be part of the  explanation for his pain he has no lateral pain.  Radiologist notes fatty type tissue possible fatty necrosis counseled the patient and son.      Supportive son present.      Subjective   Subjective      Review of Systems   Constitutional: Negative.  Negative for chills, fatigue and fever.   HENT: Negative.  Negative for congestion and dental problem.    Eyes: Negative.  Negative for blurred vision.   Respiratory: Negative.  Negative for shortness of breath.    Cardiovascular: Negative.  Negative for leg swelling.   Gastrointestinal: Negative.  Negative for abdominal pain.   Endocrine: Negative.  Negative for polyuria.   Genitourinary: Negative.  Negative for difficulty urinating.   Musculoskeletal: Positive for arthralgias.   Skin: Negative.    Allergic/Immunologic: Negative.    Neurological: Negative.    Hematological: Negative.  Negative for adenopathy.   Psychiatric/Behavioral: Negative.  Negative for behavioral problems.        Past Medical History:   Past Medical History:   Diagnosis Date   • Hyperlipidemia    • Pre-diabetes        Past Surgical History: No past surgical history on file.    Family History:   Family History   Problem Relation Age of Onset   • Diabetes Paternal Grandfather    • Hypertension Paternal Grandfather    • Hyperlipidemia Paternal Grandfather    • Stroke Paternal Grandfather    • Diabetes Father    • Stroke Father        Social History:   Social History     Socioeconomic History   • Marital status:    Tobacco Use   • Smoking status: Never Smoker   • Smokeless tobacco: Never Used   Substance and Sexual Activity   • Alcohol use: No   • Drug use: No   • Sexual activity: Defer       Medications:   Current Outpatient Medications:   •  rosuvastatin (Crestor) 10 MG tablet, Take 1 tablet by mouth Daily., Disp: 90 tablet, Rfl: 3  •  vitamin D (ERGOCALCIFEROL) 1.25 MG (54926 UT) capsule capsule, Take 1 capsule by mouth 1 (One) Time Per Week., Disp: 5 capsule, Rfl: 5  •  albuterol sulfate  " (90 Base) MCG/ACT inhaler, Inhale 2 puffs Every 4 (Four) Hours As Needed for Wheezing., Disp: 6.7 g, Rfl: 11  •  cetirizine (zyrTEC) 10 MG tablet, Take 1 tablet by mouth Daily., Disp: 90 tablet, Rfl: 3  •  fluticasone (Flonase) 50 MCG/ACT nasal spray, 2 sprays by Each Nare route Daily., Disp: 18.2 mL, Rfl: 11  •  Fluticasone Furoate-Vilanterol (BREO ELLIPTA) 100-25 MCG/INH inhaler, Inhale 1 puff Daily., Disp: 1 each, Rfl: 11  •  meloxicam (MOBIC) 15 MG tablet, Take 1 tablet by mouth Daily., Disp: 30 tablet, Rfl: 2    Allergies: No Known Allergies    The following portions of the patient's history were reviewed and updated as appropriate: allergies, current medications, past family history, past medical history, past social history, past surgical history and problem list.        Objective    Objective      Vital Signs:   Vitals:    04/05/22 0836   BP: 90/65   Height: 172 cm (67.72\")       Ortho Exam:  Left elbow the small subcutaneous area is completely free, mobile, no overlying skin changes, negative Tinel's    Full elbow range of motion notes some biceps muscle belly soreness    Some generalized arm and wrist pain  Well-perfused SLT intact    Results Review:   Imaging Results (Last 24 Hours)     ** No results found for the last 24 hours. **        [facility] MRI Lx Dx 3/1/22 LEFT elbow with and without:  Left elbow with and without contrast a subcentimeter subcutaneous area of interest 0.9 x 0.6 x 0.8 ovoid increased T2 T1 in the subcutaneous fat incidentally noted biceps tendinitis which may be part of the explanation for his pain he has no lateral pain.  Radiologist notes fatty type tissue possible fatty necrosis counseled the patient and son.        Procedures             Assessment / Plan      Assessment/Plan:   Problem List Items Addressed This Visit        Musculoskeletal and Injuries    Left elbow pain    Relevant Medications    meloxicam (MOBIC) 15 MG tablet    Other Relevant Orders    Ambulatory Referral " to Physical Therapy Evaluate and treat, Ortho (Completed)    Left wrist pain    Relevant Orders    Ambulatory Referral to Physical Therapy Evaluate and treat, Ortho (Completed)       Other    Biceps strain, left, initial encounter - Primary    Relevant Orders    Ambulatory Referral to Physical Therapy Evaluate and treat, Ortho (Completed)          LEFT ELBOW PAIN, generalized pain, biceps  Counseled on the subcutaneous mass we will continue keep a watch of this clinically.  Is asymptomatic at this time has benign-appearing findings.  Very small in size.  Counseled that if any changes arise we will get [place] [facility] [place] oncologist Ian Glasgow MD at  they will keep me updated on that front    With regards to his pain a lot of the pain appears to be coming from the biceps tendon biceps tendinitis no tearing.  Physical therapy recommended he will take the meloxicam as noted, counseled    Prediabetes-we will hold on steroid pack    Known other conditions autoimmune such as rheumatoid arthritis given the generalized pain no known history of this.    Follow Up: 6 to 8 weeks to reassess response to physical therapy.        Leroy Sellers MD, FAAOS  Orthopedic Surgeon  Fellowship Trained Shoulder and Elbow Surgeon  [facility]  Orthopedics and Sports Medicine  [address] [place] [address], [address]  [place] [address]    04/05/22  09:16 EDT

## 2022-05-10 ENCOUNTER — TELEPHONE (OUTPATIENT)
Dept: FAMILY MEDICINE CLINIC | Facility: CLINIC | Age: 52
End: 2022-05-10

## 2022-05-10 NOTE — TELEPHONE ENCOUNTER
Caller: Radha Shultz    Relationship to patient: Emergency Contact    Best call back number: [phone]    Chief complaint: MUSCLE AND JOINT PAIN F/U    Type of visit: OFFICE VISIT    Requested date: ASAP    If rescheduling, when is the original appointment: N/A     Additional notes: RADHA STATED THAT PATIENT WOULD LIKE TO SEE PROVIDER FOR F/U TO MUSCLE AND JOINT PAIN THAT HE HAS BEEN HAVING SOONER THAN 05/17/22 IF POSSIBLE

## 2022-05-24 ENCOUNTER — OFFICE VISIT (OUTPATIENT)
Dept: ORTHOPEDIC SURGERY | Facility: CLINIC | Age: 52
End: 2022-05-24

## 2022-05-24 VITALS
HEIGHT: 68 IN | BODY MASS INDEX: 25.01 KG/M2 | WEIGHT: 165 LBS | DIASTOLIC BLOOD PRESSURE: 60 MMHG | SYSTOLIC BLOOD PRESSURE: 110 MMHG

## 2022-05-24 DIAGNOSIS — M75.42 IMPINGEMENT SYNDROME OF LEFT SHOULDER: ICD-10-CM

## 2022-05-24 DIAGNOSIS — S46.212A BICEPS STRAIN, LEFT, INITIAL ENCOUNTER: Primary | ICD-10-CM

## 2022-05-24 DIAGNOSIS — M25.522 LEFT ELBOW PAIN: ICD-10-CM

## 2022-05-24 DIAGNOSIS — M25.532 LEFT WRIST PAIN: ICD-10-CM

## 2022-05-24 DIAGNOSIS — M75.52 BURSITIS OF LEFT SHOULDER: ICD-10-CM

## 2022-05-24 PROCEDURE — 99214 OFFICE O/P EST MOD 30 MIN: CPT | Performed by: ORTHOPAEDIC SURGERY

## 2022-05-24 RX ORDER — MELOXICAM 15 MG/1
TABLET ORAL
Qty: 30 TABLET | Refills: 1 | Status: SHIPPED | OUTPATIENT
Start: 2022-05-24 | End: 2023-02-23

## 2022-05-24 NOTE — PROGRESS NOTES
[facility] [place] Up       Office Follow Up Visit       Patient Name: Dragan Shultz    Chief Complaint:   Chief Complaint   Patient presents with   • Follow-up     7 week follow up - Biceps strain, left       Referring Physician: No ref. provider found    History of Present Illness:   It has been 7  week(s) since Dragan Shultz's last visit. Dragan Shultz returns to clinic today for F/U: follow-up of leftBody Part: elbowReason: pain. The issue has been ongoing for 2 month(s). Dragan Shultz rates HIS/HER: hispain at 7/10 on the pain scale. Previous/current treatments: NSAIDS. Current symptoms:Symptoms: pain; ice improves the pain. Overall, he/she: heis doing the same.  I have reviewed the patient's history of present illness as noted/entered above.    I have reviewed the patient's past medical history, surgical history, social history, family history, medications, and allergies as noted in the electronic medical record and as noted/entered.  I have reviewed the patient's review of systems as noted/enter and updated as noted in the patient's HPI.    LEFT biceps, upper biceps, wrist     Meloxicam -- counseled on usage; currently taking prn  Icing     No pain over small mass; no lateral elbow pain today     He does not seem to notice the mass no lateral elbow pain he has generalized pain this been evaluated before the left wrist and left elbow.  He notes he had tennis elbow 2 years ago but the lateral pain appears to be gone.  He has no systemic symptoms with the mass no pain at the mass very small area     [facility] MRI Lx Dx 3/1/22 LEFT elbow with and without:  Left elbow with and without contrast a subcentimeter subcutaneous area of interest 0.9 x 0.6 x 0.8 ovoid increased T2 T1 in the subcutaneous fat incidentally noted biceps tendinitis which may be part of the explanation for his pain  "he has no lateral pain.  Radiologist notes fatty type tissue possible fatty necrosis counseled the patient and son.        Supportive son present.        5/24/2022:  Patient notes he was unable to do physical therapy due to being busy with work.      Left elbow there \"is not much pain \"he basically has no symptoms of the left elbow.    More issues with left periscapular pain, right shoulder, right elbow, right wrist, generalized joint aches and pains.  That waxes and wanes rheumatology referral recommended and counseled on this.  The left elbow is doing well no evidence of any change to the potential subcutaneous lesion.  There is no pain there there is no skin changes.      Subjective   Subjective      Review of Systems   Constitutional: Negative.  Negative for chills, fatigue and fever.   HENT: Negative.  Negative for congestion and dental problem.    Eyes: Negative.  Negative for blurred vision.   Respiratory: Negative.  Negative for shortness of breath.    Cardiovascular: Negative.  Negative for leg swelling.   Gastrointestinal: Negative.  Negative for abdominal pain.   Endocrine: Negative.  Negative for polyuria.   Genitourinary: Negative.  Negative for difficulty urinating.   Musculoskeletal: Positive for arthralgias.   Skin: Negative.    Allergic/Immunologic: Negative.    Neurological: Negative.    Hematological: Negative.  Negative for adenopathy.   Psychiatric/Behavioral: Negative.  Negative for behavioral problems.        Past Medical History:   Past Medical History:   Diagnosis Date   • Hyperlipidemia    • Pre-diabetes        Past Surgical History: No past surgical history on file.    Family History:   Family History   Problem Relation Age of Onset   • Diabetes Paternal Grandfather    • Hypertension Paternal Grandfather    • Hyperlipidemia Paternal Grandfather    • Stroke Paternal Grandfather    • Diabetes Father    • Stroke Father        Social History:   Social History     Socioeconomic History   • " "Marital status:    Tobacco Use   • Smoking status: Never Smoker   • Smokeless tobacco: Never Used   Substance and Sexual Activity   • Alcohol use: No   • Drug use: No   • Sexual activity: Defer       Medications:   Current Outpatient Medications:   •  albuterol sulfate  (90 Base) MCG/ACT inhaler, Inhale 2 puffs Every 4 (Four) Hours As Needed for Wheezing., Disp: 6.7 g, Rfl: 11  •  cetirizine (zyrTEC) 10 MG tablet, Take 1 tablet by mouth Daily., Disp: 90 tablet, Rfl: 3  •  fluticasone (Flonase) 50 MCG/ACT nasal spray, 2 sprays by Each Nare route Daily., Disp: 18.2 mL, Rfl: 11  •  Fluticasone Furoate-Vilanterol (BREO ELLIPTA) 100-25 MCG/INH inhaler, Inhale 1 puff Daily., Disp: 1 each, Rfl: 11  •  meloxicam (MOBIC) 15 MG tablet, 1 PO Daily with food.  Stop if you have upset stomach/stomach irritation., Disp: 30 tablet, Rfl: 1  •  rosuvastatin (Crestor) 10 MG tablet, Take 1 tablet by mouth Daily., Disp: 90 tablet, Rfl: 3  •  vitamin D (ERGOCALCIFEROL) 1.25 MG (28462 UT) capsule capsule, Take 1 capsule by mouth 1 (One) Time Per Week., Disp: 5 capsule, Rfl: 5    Allergies: No Known Allergies    The following portions of the patient's history were reviewed and updated as appropriate: allergies, current medications, past family history, past medical history, past social history, past surgical history and problem list.        Objective    Objective      Vital Signs:   Vitals:    05/24/22 0834   BP: 110/60   Weight: 74.8 kg (165 lb)   Height: 172 cm (67.72\")       Ortho Exam:  Left elbow demonstrates no pain full active and passive range of motion.  No evidence of skin changes no evidence of palpable mass.  Counseled the patient and son about this    Left shoulder demonstrate scapular dyskinesis positive Neer positive Moreno indications of impingement syndrome and bursitis excellent rotator cuff strength generalized pain in multiple joints on the contralateral side as well    Results Review:  Imaging Results " (Last 24 Hours)     ** No results found for the last 24 hours. **          Procedures            Assessment / Plan      Assessment/Plan:   Problem List Items Addressed This Visit        Musculoskeletal and Injuries    Left elbow pain    Relevant Medications    meloxicam (MOBIC) 15 MG tablet    Other Relevant Orders    Ambulatory Referral to Physical Therapy Evaluate and treat, Ortho (Completed)    Ambulatory Referral to Rheumatology (Completed)    Left wrist pain    Relevant Medications    meloxicam (MOBIC) 15 MG tablet    Other Relevant Orders    Ambulatory Referral to Physical Therapy Evaluate and treat, Ortho (Completed)    Ambulatory Referral to Rheumatology (Completed)    Impingement syndrome of left shoulder    Relevant Medications    meloxicam (MOBIC) 15 MG tablet    Other Relevant Orders    Ambulatory Referral to Physical Therapy Evaluate and treat, Ortho (Completed)    Ambulatory Referral to Rheumatology (Completed)    Bursitis of left shoulder    Relevant Medications    meloxicam (MOBIC) 15 MG tablet    Other Relevant Orders    Ambulatory Referral to Physical Therapy Evaluate and treat, Ortho (Completed)    Ambulatory Referral to Rheumatology (Completed)       Other    Biceps strain, left, initial encounter - Primary    Relevant Medications    meloxicam (MOBIC) 15 MG tablet    Other Relevant Orders    Ambulatory Referral to Physical Therapy Evaluate and treat, Ortho (Completed)    Ambulatory Referral to Rheumatology (Completed)          Left elbow pain is resolved.  Now has left shoulder, and multiple other joints that are hurting.  Recommend consultation with rheumatology-order placed.  Meloxicam prescription was provided today along with physical therapy for left shoulder and left elbow.    Counseled regarding the left elbow subcutaneous lesion-reviewed with colleague today and reviewed myself as well.  No evidence of malignant lesion based on review.  Did  possible follow-up MRI versus referral  [facility].  They feel comfortable staying the course at this time given no symptoms.    Follow Up: 3 months, left elbow 3 views      Leroy Sellers MD, FAAOS  Orthopedic Surgeon  Fellowship Trained Shoulder and Elbow Surgeon  [facility]  Orthopedics and Sports Medicine  [address] [place] [address], [address]  [place] [address]    05/24/22  09:34 EDT

## 2022-05-26 DIAGNOSIS — J30.2 SEASONAL ALLERGIES: ICD-10-CM

## 2022-05-27 ENCOUNTER — TELEPHONE (OUTPATIENT)
Dept: ORTHOPEDIC SURGERY | Facility: CLINIC | Age: 52
End: 2022-05-27

## 2022-05-27 DIAGNOSIS — M25.511 RIGHT SHOULDER PAIN, UNSPECIFIED CHRONICITY: Primary | ICD-10-CM

## 2022-05-27 RX ORDER — FLUTICASONE PROPIONATE 50 MCG
SPRAY, SUSPENSION (ML) NASAL
Qty: 16 G | Refills: 0 | Status: SHIPPED | OUTPATIENT
Start: 2022-05-27 | End: 2022-09-21 | Stop reason: SDUPTHER

## 2022-05-27 NOTE — TELEPHONE ENCOUNTER
Shelly from [facility] called in to state that Pt is currently there and needs a new order sent over for the right shoulder sent over ASAP  Please.     FAX #[phone]

## 2022-08-25 ENCOUNTER — OFFICE VISIT (OUTPATIENT)
Dept: ORTHOPEDIC SURGERY | Facility: CLINIC | Age: 52
End: 2022-08-25

## 2022-08-25 VITALS
DIASTOLIC BLOOD PRESSURE: 60 MMHG | HEIGHT: 68 IN | SYSTOLIC BLOOD PRESSURE: 100 MMHG | BODY MASS INDEX: 25.01 KG/M2 | WEIGHT: 165 LBS

## 2022-08-25 DIAGNOSIS — S46.212A BICEPS STRAIN, LEFT, INITIAL ENCOUNTER: ICD-10-CM

## 2022-08-25 DIAGNOSIS — M25.522 LEFT ELBOW PAIN: Primary | ICD-10-CM

## 2022-08-25 DIAGNOSIS — M75.42 IMPINGEMENT SYNDROME OF LEFT SHOULDER: ICD-10-CM

## 2022-08-25 DIAGNOSIS — M25.532 LEFT WRIST PAIN: ICD-10-CM

## 2022-08-25 DIAGNOSIS — M75.52 BURSITIS OF LEFT SHOULDER: ICD-10-CM

## 2022-08-25 PROCEDURE — 99213 OFFICE O/P EST LOW 20 MIN: CPT | Performed by: ORTHOPAEDIC SURGERY

## 2022-08-25 RX ORDER — METHOCARBAMOL 500 MG/1
500 TABLET, FILM COATED ORAL 2 TIMES DAILY PRN
Qty: 28 TABLET | Refills: 0 | Status: SHIPPED | OUTPATIENT
Start: 2022-08-25 | End: 2022-09-08

## 2022-08-25 NOTE — PROGRESS NOTES
[facility] [place] [facility] Follow Up       Office Follow Up Visit       Patient Name: Dragan Shultz    Chief Complaint:   Chief Complaint   Patient presents with   • Follow-up     3 month follow up -- Bursitis of left shoulder; Left elbow pain       Referring Physician: No ref. provider found    History of Present Illness:   It has been 3  month(s) since Dragan Shultz's last visit. Dragan Shultz returns to clinic today for F/U: follow-up of leftBody Part: elbowReason: pain. The issue has been ongoing for 5 month(s). Dragan Shultz rates HIS/HER: hispain at 0/10 on the pain scale. Previous/current treatments: physical therapy. Current symptoms:Symptoms: none. The pain is worse with working; resting, ice and pain medication and/or NSAID improves the pain. Overall, he/she: heis doing better.  I have reviewed the patient's history of present illness as noted/entered above.    I have reviewed the patient's past medical history, surgical history, social history, family history, medications, and allergies as noted in the electronic medical record and as noted/entered.  I have reviewed the patient's review of systems as noted/enter and updated as noted in the patient's HPI.    LEFT biceps, upper biceps, wrist     Meloxicam -- counseled on usage; currently taking prn  Icing     No pain over small mass; no lateral elbow pain today     He does not seem to notice the mass no lateral elbow pain he has generalized pain this been evaluated before the left wrist and left elbow.  He notes he had tennis elbow 2 years ago but the lateral pain appears to be gone.  He has no systemic symptoms with the mass no pain at the mass very small area     [facility] MRI Lx Dx 3/1/22 LEFT elbow with and without:  Left elbow with and without contrast a subcentimeter subcutaneous area of interest 0.9 x 0.6 x 0.8 ovoid increased T2 T1 in  "the subcutaneous fat incidentally noted biceps tendinitis which may be part of the explanation for his pain he has no lateral pain.  Radiologist notes fatty type tissue possible fatty necrosis counseled the patient and son.        Supportive son present.           5/24/2022:  Patient notes he was unable to do physical therapy due to being busy with work.       Left elbow there \"is not much pain \"he basically has no symptoms of the left elbow.     More issues with left periscapular pain, right shoulder, right elbow, right wrist, generalized joint aches and pains.  That waxes and wanes rheumatology referral recommended and counseled on this.  The left elbow is doing well no evidence of any change to the potential subcutaneous lesion.  There is no pain there there is no skin changes.    8/25/2022:  Left elbow issues for last 6 months physical therapy helping rest ice medication helping overall improved/better he is working full duty.    He was not able to attend a rheumatological visit.  I would have him to check in with his primary care team if he still having multiple joint aches and pains as he may have a more rheumatologic and autoimmune type condition.  Supportive son present served as an  with her permission they do desire to hold on that for now but they will update their primary care team pending progress.  His left shoulder and left elbow pain appear to be better.    LEFT ELBOW --only pain with excessive work they note  LEFT SHOULDER --only has pain with mild to moderate use but overall getting better he has generalized aches and pains    Left elbow nodule appears to have resolved but he has had nodules off and on for some time could be indicative of an autoimmune type process.    Subjective   Subjective      Review of Systems   Constitutional: Negative.  Negative for chills, fatigue and fever.   HENT: Negative.  Negative for congestion and dental problem.    Eyes: Negative.  Negative for blurred " vision.   Respiratory: Negative.  Negative for shortness of breath.    Cardiovascular: Negative.  Negative for leg swelling.   Gastrointestinal: Negative.  Negative for abdominal pain.   Endocrine: Negative.  Negative for polyuria.   Genitourinary: Negative.  Negative for difficulty urinating.   Musculoskeletal: Positive for arthralgias.   Skin: Negative.    Allergic/Immunologic: Negative.    Neurological: Negative.    Hematological: Negative.  Negative for adenopathy.   Psychiatric/Behavioral: Negative.  Negative for behavioral problems.        Past Medical History:   Past Medical History:   Diagnosis Date   • Hyperlipidemia    • Pre-diabetes        Past Surgical History: No past surgical history on file.    Family History:   Family History   Problem Relation Age of Onset   • Diabetes Paternal Grandfather    • Hypertension Paternal Grandfather    • Hyperlipidemia Paternal Grandfather    • Stroke Paternal Grandfather    • Diabetes Father    • Stroke Father        Social History:   Social History     Socioeconomic History   • Marital status:    Tobacco Use   • Smoking status: Never Smoker   • Smokeless tobacco: Never Used   Substance and Sexual Activity   • Alcohol use: No   • Drug use: No   • Sexual activity: Defer       Medications:   Current Outpatient Medications:   •  albuterol sulfate  (90 Base) MCG/ACT inhaler, Inhale 2 puffs Every 4 (Four) Hours As Needed for Wheezing., Disp: 6.7 g, Rfl: 11  •  cetirizine (zyrTEC) 10 MG tablet, Take 1 tablet by mouth Daily., Disp: 90 tablet, Rfl: 3  •  fluticasone (FLONASE) 50 MCG/ACT nasal spray, Use 2 spray(s) in each nostril once daily, Disp: 16 g, Rfl: 0  •  Fluticasone Furoate-Vilanterol (BREO ELLIPTA) 100-25 MCG/INH inhaler, Inhale 1 puff Daily., Disp: 1 each, Rfl: 11  •  meloxicam (MOBIC) 15 MG tablet, 1 PO Daily with food.  Stop if you have upset stomach/stomach irritation., Disp: 30 tablet, Rfl: 1  •  rosuvastatin (Crestor) 10 MG tablet, Take 1 tablet  "by mouth Daily., Disp: 90 tablet, Rfl: 3  •  vitamin D (ERGOCALCIFEROL) 1.25 MG (18489 UT) capsule capsule, Take 1 capsule by mouth 1 (One) Time Per Week., Disp: 5 capsule, Rfl: 5  •  methocarbamol (ROBAXIN) 500 MG tablet, Take 1 tablet by mouth 2 (Two) Times a Day As Needed for Muscle Spasms for up to 14 days., Disp: 28 tablet, Rfl: 0    Allergies: No Known Allergies    The following portions of the patient's history were reviewed and updated as appropriate: allergies, current medications, past family history, past medical history, past social history, past surgical history and problem list.        Objective    Objective      Vital Signs:   Vitals:    08/25/22 1533   BP: 100/60   Weight: 74.8 kg (165 lb)   Height: 172 cm (67.72\")       Ortho Exam:  Left elbow no lesion is noted no skin changes no nodules noted.  The left elbow has full range of motion pain-free left shoulder does have some pain with Neer and Moreno but otherwise quite stoic and excellent strength excellent range of motion.  Left wrist has some generalized pain but better today.    Results Review:  Imaging Results (Last 24 Hours)     ** No results found for the last 24 hours. **          Procedures            Assessment / Plan      Assessment/Plan:   Problem List Items Addressed This Visit        Musculoskeletal and Injuries    Left elbow pain - Primary    Relevant Medications    meloxicam (MOBIC) 15 MG tablet    methocarbamol (ROBAXIN) 500 MG tablet    Other Relevant Orders    XR Elbow 3+ View Left (Completed)    Left wrist pain    Relevant Medications    meloxicam (MOBIC) 15 MG tablet    methocarbamol (ROBAXIN) 500 MG tablet    Impingement syndrome of left shoulder    Relevant Medications    meloxicam (MOBIC) 15 MG tablet    methocarbamol (ROBAXIN) 500 MG tablet    Bursitis of left shoulder    Relevant Medications    meloxicam (MOBIC) 15 MG tablet    methocarbamol (ROBAXIN) 500 MG tablet       Other    Biceps strain, left, initial encounter    " Relevant Medications    meloxicam (MOBIC) 15 MG tablet    methocarbamol (ROBAXIN) 500 MG tablet        Left shoulder Robaxin prescribed to be used as needed as it is helpful and he has upcoming trip to [place].    Counseled again he still may have a rheumatological process ongoing.  He will check in with his PCP team if they do desire to pursue follow-up with rheumatological labs etc.    From a left shoulder and elbow perspective allow him to follow-up as needed at this point.  He has had resolution clinically of the nodule on the left side of his elbow which was small that was the primary reason we wanted to continue to follow.  They will update me if any changes arise in that regard.    Follow Up: As needed      Leroy Sellers MD, FAAOS  Orthopedic Surgeon  Fellowship Trained Shoulder and Elbow Surgeon  [facility]  Orthopedics and Sports Medicine  [address] [place] [address], [address]  [place] [address]    08/31/22  12:36 EDT

## 2022-09-21 DIAGNOSIS — J45.40 MODERATE PERSISTENT ASTHMA, UNSPECIFIED WHETHER COMPLICATED: ICD-10-CM

## 2022-09-21 DIAGNOSIS — J30.2 SEASONAL ALLERGIES: ICD-10-CM

## 2022-09-21 DIAGNOSIS — M25.50 ARTHRALGIA, UNSPECIFIED JOINT: Primary | ICD-10-CM

## 2022-09-21 RX ORDER — FLUTICASONE PROPIONATE 50 MCG
2 SPRAY, SUSPENSION (ML) NASAL DAILY
Qty: 16 G | Refills: 11 | Status: SHIPPED | OUTPATIENT
Start: 2022-09-21

## 2022-11-14 ENCOUNTER — TELEPHONE (OUTPATIENT)
Dept: ORTHOPEDIC SURGERY | Facility: CLINIC | Age: 52
End: 2022-11-14

## 2022-11-14 DIAGNOSIS — M25.522 LEFT ELBOW PAIN: Primary | ICD-10-CM

## 2022-11-14 DIAGNOSIS — S46.212A BICEPS STRAIN, LEFT, INITIAL ENCOUNTER: ICD-10-CM

## 2022-11-14 NOTE — TELEPHONE ENCOUNTER
Caller: [facility]    Relationship: PHYSICAL THERAPIST    Best call back number:[phone]    What orders are you requesting (i.e. lab or imaging): PATIENT REQUESTING CONTINUED PHYSICAL THERAPY    In what timeframe would the patient need to come in: ASAP    Where will you receive your lab/imaging services: [facility]  FAX: [phone]      Additional notes: PATIENT HAS REQUESTED MORE PT FROM FACILITY AND FACILITY IS CALLING TO REQUEST ORDER

## 2022-11-14 NOTE — TELEPHONE ENCOUNTER
Fabiana please see message below and advise additional PT for this alma patient.     Thank you,   Yadira Lundy

## 2022-11-15 ENCOUNTER — TELEPHONE (OUTPATIENT)
Dept: ORTHOPEDIC SURGERY | Facility: CLINIC | Age: 52
End: 2022-11-15

## 2022-11-15 NOTE — TELEPHONE ENCOUNTER
Caller: [facility]     Relationship: PHYSICAL THERAPIST     Best call back number:[phone]     What orders are you requesting (i.e. lab or imaging): PATIENT REQUESTING CONTINUED PHYSICAL THERAPY FOR LEFT SHOULDER      In what timeframe would the patient need to come in: ASAP     Where will you receive your lab/imaging services: [facility]  FAX: [phone]        Additional notes: PATIENT HAS REQUESTED MORE PT FROM FACILITY AND FACILITY IS CALLING TO REQUEST ORDER

## 2022-11-18 ENCOUNTER — TELEPHONE (OUTPATIENT)
Dept: FAMILY MEDICINE CLINIC | Facility: CLINIC | Age: 52
End: 2022-11-18

## 2022-11-18 RX ORDER — FLUTICASONE PROPIONATE AND SALMETEROL 250; 50 UG/1; UG/1
1 POWDER RESPIRATORY (INHALATION)
Qty: 60 EACH | Refills: 2 | Status: SHIPPED | OUTPATIENT
Start: 2022-11-18

## 2022-11-18 NOTE — TELEPHONE ENCOUNTER
Unfamiliar with this patient.  Has he used any other steroid inhalers to treat asthma that have not been effective in the past?  Symbicort or Advair are other options that we can change the Breo to

## 2022-11-18 NOTE — TELEPHONE ENCOUNTER
Caller: MACI LEES    Relationship: Emergency Contact    Best call back number: [phone]    What is the best time to reach you: ANYTIME    Who are you requesting to speak with (clinical staff, provider,  specific staff member): CLINICAL    What was the call regarding: PATIENT IS TRYING TO GET A NEW BREO INHALER BUT I AM UNABLE TO SEND A REFILL REQUEST FOR IT. PLEASE SEND OVER TO THE PHARMACY. [facility] [phone]    Do you require a callback: YES

## 2022-12-08 ENCOUNTER — OFFICE VISIT (OUTPATIENT)
Dept: ORTHOPEDIC SURGERY | Facility: CLINIC | Age: 52
End: 2022-12-08

## 2022-12-08 VITALS — BODY MASS INDEX: 25.57 KG/M2 | SYSTOLIC BLOOD PRESSURE: 140 MMHG | DIASTOLIC BLOOD PRESSURE: 78 MMHG | WEIGHT: 166.8 LBS

## 2022-12-08 DIAGNOSIS — M25.511 RIGHT SHOULDER PAIN, UNSPECIFIED CHRONICITY: ICD-10-CM

## 2022-12-08 DIAGNOSIS — M54.2 NECK PAIN: ICD-10-CM

## 2022-12-08 DIAGNOSIS — M75.41 IMPINGEMENT SYNDROME OF RIGHT SHOULDER: ICD-10-CM

## 2022-12-08 DIAGNOSIS — M54.12 CERVICAL RADICULOPATHY: Primary | ICD-10-CM

## 2022-12-08 PROCEDURE — 99214 OFFICE O/P EST MOD 30 MIN: CPT | Performed by: ORTHOPAEDIC SURGERY

## 2022-12-08 RX ORDER — METHYLPREDNISOLONE 4 MG/1
TABLET ORAL
Qty: 1 EACH | Refills: 0 | Status: SHIPPED | OUTPATIENT
Start: 2022-12-08 | End: 2023-02-23

## 2022-12-08 NOTE — PROGRESS NOTES
[facility] Follow Up       Office Follow Up Visit       Patient Name: Dragan Shultz    Chief Complaint:   Chief Complaint   Patient presents with   • Follow-up     Right Shoulder       Referring Physician: No ref. provider found    History of Present Illness:   It has been 4  month(s) since Dragan Shultz's last visit. Dragan Suhltz returns to clinic today for F/U: follow-up of rightBody Part: shoulderReason: pain. The issue has been ongoing for 2 week(s). Dragan Shultz rates HIS/HER: hispain at 8/10 on the pain scale. Previous/current treatments: physical therapy. Current symptoms:Symptoms: pain. The pain is worse with sitting, working, lying on affected side and any movement of the joint; ice and heat improves the pain. Overall, he/she: heis doing the same. I have reviewed the patient's history of present illness as noted/entered above.    I have reviewed the patient's past medical history, surgical history, social history, family history, medications, and allergies as noted in the electronic medical record and as noted/entered.  I have reviewed the patient's review of systems as noted/enter and updated as noted in the patient's HPI.      12/8/2022:   RIGHT SHOULDER PAIN -- new diagnosis  Generalized arm pain  Prior contralateral arm pain    Neck pain  Radiating pain    Based on his symptomatology and prior evaluation on the contralateral side this appears to be more neurogenic in origin as he has had bilateral arm symptomatology fairly generalized and vague.    Supportive son present.    Son notes that he will be out of town for about a month starting mid-to-late December.        Prior evaluation:  LEFT biceps, upper biceps, wrist     Meloxicam -- counseled on usage; currently taking prn  Icing     No pain over small mass; no lateral elbow pain today     He does not seem to  "notice the mass no lateral elbow pain he has generalized pain this been evaluated before the left wrist and left elbow.  He notes he had tennis elbow 2 years ago but the lateral pain appears to be gone.  He has no systemic symptoms with the mass no pain at the mass very small area     [facility] MRI Lx Dx 3/1/22 LEFT elbow with and without:  Left elbow with and without contrast a subcentimeter subcutaneous area of interest 0.9 x 0.6 x 0.8 ovoid increased T2 T1 in the subcutaneous fat incidentally noted biceps tendinitis which may be part of the explanation for his pain he has no lateral pain.  Radiologist notes fatty type tissue possible fatty necrosis counseled the patient and son.        Supportive son present.           5/24/2022:  Patient notes he was unable to do physical therapy due to being busy with work.       Left elbow there \"is not much pain \"he basically has no symptoms of the left elbow.     More issues with left periscapular pain, right shoulder, right elbow, right wrist, generalized joint aches and pains.  That waxes and wanes rheumatology referral recommended and counseled on this.  The left elbow is doing well no evidence of any change to the potential subcutaneous lesion.  There is no pain there there is no skin changes.     8/25/2022:  Left elbow issues for last 6 months physical therapy helping rest ice medication helping overall improved/better he is working full duty.     He was not able to attend a rheumatological visit.  I would have him to check in with his primary care team if he still having multiple joint aches and pains as he may have a more rheumatologic and autoimmune type condition.  Supportive son present served as an  with her permission they do desire to hold on that for now but they will update their primary care team pending progress.  His left shoulder and left elbow pain appear to be better.     LEFT ELBOW --only pain with excessive work they note  LEFT SHOULDER --only has " pain with mild to moderate use but overall getting better he has generalized aches and pains     Left elbow nodule appears to have resolved but he has had nodules off and on for some time could be indicative of an autoimmune type process.      Subjective   Subjective      Review of Systems   Musculoskeletal: Positive for myalgias.        Past Medical History:   Past Medical History:   Diagnosis Date   • Hyperlipidemia    • Pre-diabetes        Past Surgical History: No past surgical history on file.    Family History:   Family History   Problem Relation Age of Onset   • Diabetes Paternal Grandfather    • Hypertension Paternal Grandfather    • Hyperlipidemia Paternal Grandfather    • Stroke Paternal Grandfather    • Diabetes Father    • Stroke Father        Social History:   Social History     Socioeconomic History   • Marital status:    Tobacco Use   • Smoking status: Never   • Smokeless tobacco: Never   Substance and Sexual Activity   • Alcohol use: No   • Drug use: No   • Sexual activity: Defer       Medications:   Current Outpatient Medications:   •  albuterol sulfate  (90 Base) MCG/ACT inhaler, Inhale 2 puffs Every 4 (Four) Hours As Needed for Wheezing., Disp: 6.7 g, Rfl: 11  •  cetirizine (zyrTEC) 10 MG tablet, Take 1 tablet by mouth Daily., Disp: 90 tablet, Rfl: 3  •  fluticasone (FLONASE) 50 MCG/ACT nasal spray, 2 sprays into the nostril(s) as directed by provider Daily., Disp: 16 g, Rfl: 11  •  Fluticasone-Salmeterol (Advair Diskus) 250-50 MCG/ACT DISKUS, Inhale 1 puff 2 (Two) Times a Day., Disp: 60 each, Rfl: 2  •  meloxicam (MOBIC) 15 MG tablet, 1 PO Daily with food.  Stop if you have upset stomach/stomach irritation., Disp: 30 tablet, Rfl: 1  •  rosuvastatin (Crestor) 10 MG tablet, Take 1 tablet by mouth Daily., Disp: 90 tablet, Rfl: 3  •  vitamin D (ERGOCALCIFEROL) 1.25 MG (17210 UT) capsule capsule, Take 1 capsule by mouth 1 (One) Time Per Week., Disp: 5 capsule, Rfl: 5  •   methylPREDNISolone (MEDROL) 4 MG dose pack, Use as directed by package instructions, Disp: 1 each, Rfl: 0    Allergies: No Known Allergies    The following portions of the patient's history were reviewed and updated as appropriate: allergies, current medications, past family history, past medical history, past social history, past surgical history and problem list.        Objective    Objective      Vital Signs:   Vitals:    12/08/22 1453   BP: 140/78   Weight: 75.7 kg (166 lb 12.8 oz)       Ortho Exam:  Neck pain, neck stiffness, positive Spurling's with radiating pain down the right arm he reports some subjective numbness and tingling nondermatomal distribution.    No obvious weakness on clinical exam but describes sharp radiating pain in his upper neck radiating down into his forearm and hand.    Shoulder shows impingement syndrome positive Neer positive Moreno good rotator cuff strength    Results Review:  Imaging Results (Last 24 Hours)     Procedure Component Value Units Date/Time    XR Spine Cervical Complete 4 or 5 View [[id number]] Resulted: 12/08/22 1621     Updated: 12/08/22 1622    Narrative:      Imaging: neck/cervical spine 4 views - AP, lateral, flexion, and extension   x-ray views    Location: neck/cervical spine    Indication for x-rays: neck pain    Comparison: no comparison views available    Findings:   The patient has degenerative disc disease noted at multiple   levels-findings are most notable at C4-5, C5-6 and C6-7 with disc base   narrowing and anterior osteophytes noted.    I personally reviewed the above x-rays.      XR Shoulder 2+ View Right [[id number]] Resulted: 12/08/22 1520     Updated: 12/08/22 1522    Narrative:      Imaging: shoulder x-rays 2 views - AP and scapular Y x-ray views    Side: RIGHT SHOULDER    Indication for shoulder x-ray 2 views: shoulder pain    Comparison: no comparison views available    Findings: No acute bony pathology. No superior humeral head migration.    The  humeral head remains centered in the glenohumeral joint. No evidence   of calcific tendonitis.    Based on degenerative AC joint changes.  No acute bony findings.    I personally reviewed the above x-rays and discussed with the patient.            Procedures            Assessment / Plan      Assessment/Plan:   Problem List Items Addressed This Visit        Musculoskeletal and Injuries    Neck pain    Relevant Medications    methylPREDNISolone (MEDROL) 4 MG dose pack    Other Relevant Orders    MRI Cervical Spine Without Contrast    EMG & Nerve Conduction Test    Ambulatory Referral to Physical Therapy    Ambulatory Referral to Pain Management    Impingement syndrome of right shoulder    Relevant Medications    methylPREDNISolone (MEDROL) 4 MG dose pack    Other Relevant Orders    MRI Cervical Spine Without Contrast    EMG & Nerve Conduction Test    Ambulatory Referral to Physical Therapy    Ambulatory Referral to Pain Management    Right shoulder pain    Relevant Medications    methylPREDNISolone (MEDROL) 4 MG dose pack    Other Relevant Orders    MRI Cervical Spine Without Contrast    EMG & Nerve Conduction Test    Ambulatory Referral to Physical Therapy    Ambulatory Referral to Pain Management       Neuro    Cervical radiculopathy - Primary    Relevant Medications    methylPREDNISolone (MEDROL) 4 MG dose pack    Other Relevant Orders    MRI Cervical Spine Without Contrast    EMG & Nerve Conduction Test    Ambulatory Referral to Physical Therapy    Ambulatory Referral to Pain Management       Right shoulder pain I think this will resolve with conservative course.  However his neurogenic pain appears to be most troublesome with regards to bilateral symptomatology neck related findings on clinical exam and on radiographs.  I think this may explain his vague symptomatology of bilateral upper extremities.  It does not appear to be intrinsic to the shoulder.  MRI the cervical spine recommended EMG/NCV of bilateral  upper extremities recommended, physical therapy for the neck recommended along with shoulder.  I do think he would benefit from seeing Dr. Boston to evaluate for conservative course for his neck.    Follow Up: He can see me back as needed if they have any questions or concerns regards to his MRI of the cervical spine happy to assist.  They can see Dr. Boston to discuss the neck related treatment options and MRI once completed as well.      Leroy Sellers MD, FAAOS  Orthopedic Surgeon  Fellowship Trained Shoulder and Elbow Surgeon  [facility]  Orthopedics and Sports Medicine  [address] [place] [address], [address]  [place] [address]    12/08/22  16:32 EST

## 2022-12-12 ENCOUNTER — TELEPHONE (OUTPATIENT)
Dept: ORTHOPEDIC SURGERY | Facility: CLINIC | Age: 52
End: 2022-12-12

## 2022-12-12 NOTE — TELEPHONE ENCOUNTER
Called son, explained that Chiropractic doesn't need a referral, but gave him some suggestions of [facility] in [place] for possible PT options that accept his dad's insurance.

## 2022-12-12 NOTE — TELEPHONE ENCOUNTER
PT SON RADHA LEES CALLED ON BEHALF OF HIS FATHER. HE HAS AN APPOINTMENT FOR PHYSICAL THERAPY ON 1/9/23. HIS SHOULDER PAIN IS NOT TOLERABLE AND PT IS WANTING TO KNOW IF DR. WILLS CAN REFER HIM TO A CHIROPRACTOR BEFORE THE APPOINTMENT OR SHALL HE GO TO THE ER?    PLEASE CALL SON [phone].

## 2022-12-14 ENCOUNTER — TELEPHONE (OUTPATIENT)
Dept: ORTHOPEDIC SURGERY | Facility: CLINIC | Age: 52
End: 2022-12-14

## 2023-01-23 ENCOUNTER — HOSPITAL ENCOUNTER (OUTPATIENT)
Dept: MRI IMAGING | Facility: HOSPITAL | Age: 53
Discharge: HOME | End: 2023-01-23
Admitting: ORTHOPAEDIC SURGERY
Payer: MEDICAID

## 2023-01-23 DIAGNOSIS — M25.511 RIGHT SHOULDER PAIN, UNSPECIFIED CHRONICITY: ICD-10-CM

## 2023-01-23 DIAGNOSIS — M75.41 IMPINGEMENT SYNDROME OF RIGHT SHOULDER: ICD-10-CM

## 2023-01-23 DIAGNOSIS — M54.12 CERVICAL RADICULOPATHY: ICD-10-CM

## 2023-01-23 DIAGNOSIS — M54.2 NECK PAIN: ICD-10-CM

## 2023-01-23 PROCEDURE — 72141 MRI NECK SPINE W/O DYE: CPT

## 2023-02-18 PROBLEM — M50.123 CERVICAL DISC DISORDER AT C6-C7 LEVEL WITH RADICULOPATHY: Status: ACTIVE | Noted: 2023-02-18

## 2023-02-18 PROBLEM — M99.71 CONN TISS AND DISC STENOSIS OF INTVRT FORAMIN OF CERV REGION: Status: ACTIVE | Noted: 2023-02-18

## 2023-02-18 NOTE — PROGRESS NOTES
"Chief Complaint: \"Neck and right shoulder blade pain\"          History of Present Illness:   Patient: Mr. Dragan Shultz, 53 y.o. male   Referring Physician: Dr. Leroy Sellers   Reason for Referral: Consultation for chronic intractable right shoulder pain.   Pain History: Patient reports a longstanding history of chronic progressive right shoulder pain, which began without incident. Patient was seen in the presence of his son and an  although he speaks some english. In 2019, he saw Dr. Law for left shoulder pain. In April 2022, he was evaluated by orthopedics presenting with a 1 year history of unrelenting right shoulder pain, generalized arm pain, and some posterior neck pain. He also has a history of lateral epicondylitis. Dragan Shultz underwent orthopedic surgical consultation with Dr. Leroy Sellers on 12/08/2022, and was found not to be a surgical candidate. Dr. Sellers recommended obtaining an MRI of the cervical spine and electrodiagnostic studies of the upper extremities and referred Mr. Shultz to me in December 2022, but patient was unable to come for consultation due to being out of town. In addition, he was referred to physical therapy.  Right shoulder X-rays on 12/30/2022 revealed humeral head centered in the glenohumeral joint. Moderate degenerative AC joint changes. No evidence of calcific tendinitis. EMG/NCV on 02/03/2023 revealed right C7 radiculopathy. Mild right carpal tunnel syndrome. Cervical X-rays on 12/08/2022 revealed degenerative disc disease most notable at C4-C5, C5-C6, and C6-C7. MRI of the cervical spine without contrast on 01/23/2023 revealed mild retrolisthesis of C6 on C7. Multilevel spondylosis.  At C5-C6: Disc osteophyte complex eccentric to the left, facet arthropathy. Moderate canal stenosis and left foraminal stenosis. At C6-C7: Disc osteophyte complex, facet hypertrophy.  Moderate canal and bilateral neuroforaminal stenosis. There appears " to be clinical, radiological, and electrodiagnostic correlation for C6-C7 radiculopathy. Pain has progressed in intensity over the past year. Patient has failed to obtain pain relief with conservative measures for more than 1 year including oral analgesics, topical analgesics, physical therapy (last visit 2 weeks ago for 20 visits), physical therapist directed home exercise program HEP (ongoing), ice, heat, to name a few.  Pain Description: Constant right-sided neck and right shoulder blade pain with intermittent exacerbation, described as aching and dull sensation.   Radiation of Pain: The pain radiates into the right shoulder blade  Pain intensity today: 5/10   Average pain intensity last week: 6/10  Pain intensity ranges from: 0/10 to 10/10  Aggravating factors: Pain increases with extension of the cervical spine and sometimes when using his right arm  Alleviating factors: Pain decreases with sitting down, lying down  Associated Symptoms:   Patient denies reports pain, numbness, tingling and weakness in the right upper extremity. Denies left-sided symptoms  Patient denies any new bladder or bowel problems.   Patient denies difficulties with his balance or recent falls.   Pain interferes with general activities and affects patient's quality of life  Pain does not interfere with sleep   Stiffness: No    Review of previous therapies and additional medical records:  Dragan Shultz has already failed the following measures, including:   Conservative Measures: Oral analgesics, topical analgesics, physical therapy (last visit 2 weeks ago for 20 visits), physical therapist directed home exercise program HEP (ongoing), ice, heat,   Interventional Measures: None  Surgical Measures: No history of previous cervical spine or shoulder surgery   Dragan Shultz underwent orthopedic surgical consultation with Dr. Leroy Sellers on 12/08/2022, and was found not to be a surgical candidate.  Dragan  Andrew Shultz presents with significant comorbidities including hyperlipidemia, pre-diabetes, asthma  In terms of current analgesics, Dragan Shultz takes:   I have reviewed Juan Ramon Report consistent with medication reconciliation.  SOAPP: Low Risk     PHQ-2 Depression Screening  Little interest or pleasure in doing things? 0-->not at all   Feeling down, depressed, or hopeless? 0-->not at all   PHQ-2 Total Score 0     Patient screened negative for depression based on a PHQ-2 score of 0 on 02/23/2023    Global Pain Scale 02-23 2023          Pain 10          Feelings 0          Clinical outcomes 5          Activities 5          GPS Total: 20            NECK PAIN DISABILITY INDEX QUESTIONNAIRE  DATE 02-23 2023            Pain intensity  0: No pain  1: Mild  2: Moderate  3: Fairly severe  4: very severe  5: Worst imaginable 1            Personal Care   0: I can look after myself normally without causing extra pain.  1: I can look after myself normally, but it causes extra pain.  2: It is painful to look after myself and I am slow and careful.  3: I need some help, but manage most of my personal care.  4: I need help every day in most aspects of self-care.  5: I do not get dressed; I wash with difficulty and stay in bed. 1            Lifting  0: I can lift heavy weights without extra pain.  1: I can lift heavy weights, but it gives extra pain.  2: Pain prevents me from lifting heavy weights off the floor but I can manage if they are conveniently positioned, for example, on a table.  3: Pain prevents me from lifting heavy weights, but I can manage light to medium weights if they are conveniently positioned.  4: I can lift very light weights.  5: I cannot lift or carry anything at all. 4           Reading  0: I can read as much as I want to with no pain in my neck.  1: I can read as much as I want to with slight pain in my neck.  2: I can read as much as I want to with moderate pain in my neck.  3: I cannot  read as much as I want because of moderate pain in my neck.  4: I cannot read as much as I want because of severe pain in my neck.  5: I cannot read at all. 1           Headaches  0: I have no headaches at all.  1: I have slight headaches which come infrequently.  2: I have moderate headaches which come infrequently.  3: I have moderate headaches which come frequently.  4: I have severe headaches which come frequently.  5: I have headaches almost all the time. 0           Concentration  0: I can concentrate fully when I want to with no difficulty.  1: I can concentrate fully when I want to with slight difficulty.  2: I have a fair degree of difficulty in concentrating when I want to.  3: I have a lot of difficulty in concentrating when I want to.  4: I have a great deal of difficulty in concentrating when I want to.  5: I cannot concentrate at all. 0           Work  0: I can do as much work as I want to.  1: I can only do my usual work, but no more.  2: I can do most of my usual work, but no more.  3: I cannot do my usual work.  4: I can hardly do any work at all.  5: I cannot do any work at all.   3            Driving  0: I can drive my car without any neck pain.  1: I can drive my car as long as I want with slight pain in my neck.  2: I can drive my car as long as I want with moderate pain in my   neck.  3: I cannot drive my car as long as I want because of moderate pain   in my neck.  4: I can hardly drive at all because of severe pain in my neck.  5: I cannot drive my car at all. 2           Sleeping  0: I have no trouble sleeping.  1: My sleep is slightly disturbed (less than 1 hour sleepless).  2: My sleep is mildly disturbed (1-2 hours sleepless).  3: My sleep is moderately disturbed (2-3 hours sleepless).  4: My sleep is greatly disturbed (3-5 hours sleepless).  5: My sleep is completely disturbed (5-7 hours) 2           Recreation  0: I am able to engage in all of my recreational activities with no neck  pain at all.  1: I am able to engage in all of my recreational activities with some pain in my neck.  2: I am able to engage in most, but not all of my recreational activities because of pain in my neck.  3: I am able to engage in a few of my recreational activities because of pain in my neck.  4: I can hardly do any recreational activities because of pain in my neck.  5: I cannot do any recreational activities at all. 2           TOTAL SCORE 16             Shoulder Pain and Disability Index (SPADI)   PAIN SCALE:   How severe is your pain?   Pain scale 0/10 to 10/10 02-23 2023      What number describes your pain at its worst?  0      When lying on the involved side? 3      Reaching for something on a high shelf?  0      Touching the back of your neck? 0      Pushing with the involved arm? 2      Total Pain Score (MAX 50) 5             DISABILITY SCALE:   How much difficulty do you have?  Difficulty scale 0/10 to 10/10        Washing your hair? 0      Washing your back? 7      Putting on an undershirt or jumper?  2      Putting on a shirt that buttons down the front?  2      Putting on your pants?  0      Placing an object on a high shelf?  3      Carrying a heavy object of 10 pounds (4.5 KG) 8      Removing something from your back pocket?  0      Total Disability Score (MAX 80) 22             TOTAL SPADI SCORE () 27        Review of Diagnostic Studies: I have reviewed and interpreted the images with the patient and used the images and a tridimensional spine model to explain findings. I have reviewed the report, as well.  EMG/NCV on 02/03/2023 revealed right C7 radiculopathy.  Mild right carpal tunnel syndrome  Cervical x-rays on 12/8/2022 revealed degenerative disc disease most notable at C4-C5, C5-C6, C6-C7  Right shoulder x-rays on 12/30/2022 revealed humeral head centered in the glenohumeral joint.  No evidence of calcific tendinitis.  Moderate degenerative AC joint changes.  MRI of the cervical spine  without contrast on 1/23/2023 revealed a straightening of the cervical lordosis.  Mild retrolisthesis of C6 on C7.  Spinal cord caliber and signal are normal.  Multilevel spondylosis.  Axial imaging:  C2-C3: No significant canal or foraminal stenosis  C3-C4: Disc osteophyte complex, facet hypertrophy. Mild canal stenosis and left foraminal stenosis  C4-C5: Disc osteophyte complex, facet hypertrophy. Mild to moderate canal stenosis and bilateral foraminal stenosis  C5-C6: Disc osteophyte complex eccentric to the left, facet arthropathy. Moderate canal stenosis and left foraminal stenosis  C6-C7: Disc osteophyte complex, facet hypertrophy. Moderate canal and bilateral neuroforaminal stenosis     Review of Systems   Musculoskeletal: Positive for back pain and myalgias.   Neurological: Positive for numbness.   All other systems reviewed and are negative.        Patient Active Problem List   Diagnosis   • Moderate persistent asthma   • Allergic rhinitis   • Biceps strain, left, initial encounter   • Left elbow pain   • Left wrist pain   • Impingement syndrome of left shoulder   • Bursitis of left shoulder   • Neck pain   • Impingement syndrome of right shoulder   • Cervical radiculopathy   • Right shoulder pain   • Cervical disc disorder at C6-C7 level with radiculopathy   • Conn tiss and disc stenosis of intvrt foramin of cerv region   • Spondylolisthesis of cervical region   • Cervical spondylosis without myelopathy   • DDD (degenerative disc disease), cervical   • TOS (thoracic outlet syndrome)       Past Medical History:   Diagnosis Date   • Hyperlipidemia    • Pre-diabetes          History reviewed. No pertinent surgical history.      Family History   Problem Relation Age of Onset   • Diabetes Paternal Grandfather    • Hypertension Paternal Grandfather    • Hyperlipidemia Paternal Grandfather    • Stroke Paternal Grandfather    • Diabetes Father    • Stroke Father          Social History     Socioeconomic History  "  • Marital status:    Tobacco Use   • Smoking status: Never   • Smokeless tobacco: Never   Substance and Sexual Activity   • Alcohol use: No   • Drug use: No   • Sexual activity: Defer           Current Outpatient Medications:   •  acetaminophen (TYLENOL) 500 MG tablet, Take 500 mg by mouth Every 6 (Six) Hours As Needed for Mild Pain., Disp: , Rfl:   •  albuterol sulfate  (90 Base) MCG/ACT inhaler, Inhale 2 puffs Every 4 (Four) Hours As Needed for Wheezing., Disp: 6.7 g, Rfl: 11  •  cetirizine (zyrTEC) 10 MG tablet, Take 1 tablet by mouth Daily., Disp: 90 tablet, Rfl: 3  •  fluticasone (FLONASE) 50 MCG/ACT nasal spray, 2 sprays into the nostril(s) as directed by provider Daily., Disp: 16 g, Rfl: 11  •  Fluticasone-Salmeterol (Advair Diskus) 250-50 MCG/ACT DISKUS, Inhale 1 puff 2 (Two) Times a Day., Disp: 60 each, Rfl: 2  •  vitamin D (ERGOCALCIFEROL) 1.25 MG (49343 UT) capsule capsule, Take 1 capsule by mouth 1 (One) Time Per Week., Disp: 5 capsule, Rfl: 5      No Known Allergies      /60   Pulse 74   Resp 16   Ht 170.2 cm (67\")   Wt 77.1 kg (170 lb)   SpO2 98%   BMI 26.63 kg/m²       Physical Exam:  Constitutional: Patient appears well-developed, well-nourished, well-hydrated  HEENT: Head: Normocephalic and atraumatic  Eyes: Conjunctivae and lids are normal  Pupils: Equal, round, reactive to light  Neck: Trachea normal. Neck supple. No JVD present.   Lymphatic: No cervical adenopathy  Musculoskeletal   Gait and station: Gait evaluation demonstrated a normal gait. Able to walk on heels, toes, tandem walking   Cervical spine: Passive and active range of motion are limited secondary to pain. Extension, flexion, lateral flexion, rotation of the cervical spine increased pain. Cervical facet joint loading maneuvers are positive.  Muscles: Presence of active trigger points: none  Shoulders: The range of motion of the glenohumeral joints is full and without pain. Rotator cuff strength is 5/5. " Negative Tiffanie Jarrett.  Positive Adson's bilaterally  Neurological:   Patient is alert and oriented to person, place, and time.   Speech: Normal.   Cortical function: Normal mental status.   Reflex Scores:  Right brachioradialis: 2+  Left brachioradialis: 2+  Right biceps: 2+  Left biceps: 2+  Right triceps: 2+  Left triceps: 2+  Right patellar: 2+  Left patellar: 2+  Right Achilles: 1+  Left Achilles: 1+  Motor strength: 5/5  Motor Tone: Normal  Involuntary movements: None.   Superficial/Primitive Reflexes: Primitive reflexes were absent.   Right Verma: Absent  Left Verma: Absent  Right ankle clonus: Absent  Left ankle clonus: Absent   Babinsky: Absent  Spurling sign: Positive. Neck tornado test: Positive. Lhermitte sign: Negative. Long tract signs: Negative.   Sensory exam: Intact to light touch, intact pain and temperature sensation, intact vibration sensation and normal proprioception.   Coordination: Normal finger to nose and heel to shin. Normal balance and negative Romberg's sign   Skin and subcutaneous tissue: Skin is warm and intact. No rash noted. No cyanosis.   Psychiatric: Judgment and insight: Normal. Recent and remote memory: Intact. Mood and affect: Normal.       ASSESSMENT:   1. Cervical disc disorder at C6-C7 level with radiculopathy    2. Conn tiss and disc stenosis of intvrt foramin of cerv region    3. Spondylolisthesis of cervical region    4. DDD (degenerative disc disease), cervical    5. Cervical spondylosis without myelopathy    6. TOS (thoracic outlet syndrome)        PLAN/MEDICAL DECISION MAKING:  Mr. Dragan Shultz, 53 y.o. male presents with a longstanding history of chronic progressive right shoulder pain, which began without incident. In 2019, he saw Dr. Law for left shoulder pain. In April 2022, he was evaluated by orthopedics presenting with a 1 year history of unrelenting right shoulder pain, generalized arm pain, and some posterior neck pain. He also had a  history of lateral epicondylitis. Dragan Shultz underwent orthopedic surgical consultation with Dr. Leroy Sellers on 12/08/2022, and was found not to be a surgical candidate. Dr. Sellers recommended obtaining an MRI of the cervical spine and electrodiagnostic studies of the upper extremities. In addition, he was referred to physical therapy. Right shoulder X-rays on 12/30/2022 revea no significant abnormalities led and only moderate AC joint degenerative changes.  No evidence of calcific tendinitis.  Cervical X-rays on 12/08/2022 revealed degenerative disc disease most notable at C4-C5, C5-C6, and C6-C7. MRI of the cervical spine without contrast on 01/23/2023 revealed mild retrolisthesis of C6 on C7. Multilevel spondylosis.  At C5-C6: Disc osteophyte complex eccentric to the left, facet arthropathy. Moderate canal stenosis and left foraminal stenosis. At C6-C7: Disc osteophyte complex, facet hypertrophy.  Moderate canal and bilateral neuroforaminal stenosis. EMG/NCV on 02/03/2023 revealed right C7 radiculopathy. Mild right carpal tunnel syndrome. There appears to be clinical, radiological, and electrodiagnostic correlation for right C6-C7 radiculopathy. Pain has progressed in intensity over the past year.  Upon physical examination, there is no evidence of an intrinsic shoulder derangement.  He has a positive Spurling response, positive neck tornado test.  Lhermitte is negative.  There is no evidence of cervical myelopathy.  Maneuvers for TOS, Adson's, etc. are positive.  Patient has failed to obtain pain relief with conservative measures for more than 1 year including oral analgesics, topical analgesics, physical therapy (last visit 2 weeks ago for 20 visits), physical therapist directed home exercise program HEP (ongoing), ice, heat, to name a few. A comprehensive evaluation including history and physical exam along with pertinent physiologic and functional assessment was performed. Patient presents  with intractable pain due to the diagnoses listed above. Patient has failed to respond to conservative modalities, as referenced under HPI including the impact of patient's moderate-to-severe pain contributing to significant impairment in daily activities, ADLs, and a negative impact on quality of life. Supporting diagnostic studies of patient's chronic pain condition have been reviewed. I have reviewed all available patient's medical records as well as previous therapies as referenced above. I had a lengthy conversation with . Dragan Shultz regarding his chronic pain condition and potential therapeutic options including risks, benefits, alternative therapies, to name a few. We have discussed using a stepwise approach starting with the shortest or least intense level of treatment, care, or service as determined by the extent required to diagnose and or treat patient's condition. The treatments proposed are consistent with the patient's medical condition and are known to be as safe and effective by current guidelines and standard of care. There is no evidence of absolute contraindications for the proposed procedures under the current circumstances. These treatments are not considered experimental or investigational. The duration and frequency proposed are considered appropriate for the service in accordance with accepted standards of medical practice for the diagnosis and or treatment of the patient's condition and or intended to improve the patient's level of function. These services will be furnished in a setting appropriate to the patient's medical needs and condition. Therefore, I have proposed the following plan:  1. Interventional pain management measures: Patient will be scheduled for cervical epidural steroid injection by right paramedian interlaminar with addition of hyaluronidase approach using the lowest effective dose of steroids, under C-arm fluoroscopic guidance, with the use of contrast  dye (unless contraindicated) to confirm appropriate needle placement and spread of contrast dye. We may repeat cervical epidural steroid injection by right paramedian interlaminar approach with addition of hyaluronidase versus diagnostic and therapeutic right C6-C7 transforaminal epidural steroid injection depending on patient's outcome.  As per current guidelines, epidurals will be limited to a maximum of 4 sessions per spinal region in a rolling twelve (12) month period. Continuation of epidural steroid injections over 12 months would only be considered under the following provisions;  • Patient is a high-risk surgical candidate, or the patient does not desire surgery, or recurrence of pain in the same location relieved with ESIs for at least three months and epidural provides at least 50% sustained improvement of pain and/or 50% objective improvement in function (using same scale as baseline)  • Pain is severe enough to cause a significant degree of functional disability or vocational disability  • The primary care provider will be notified regarding continuation of procedures and repeat steroid use   We may repeat epidural depending on patient's outcome.  If he continues to struggle with pain, I will refer him to Dr. Senior for neurosurgical consultation patient may need additional diagnostic studies including cervical myelogram followed by CT post myelogram for surgical delineation as he presents with multilevel canal, lateral recess and neuroforaminal stenosis.  2. Diagnostic studies:   A. Flexion and extension X-rays of the cervical spine to assess cervical stability  B. Duplex carotid ultrasound and duplex arterial ultrasound upper extremity stress with TOS maneuvers, Dr. Donald to read  3. Pharmacological measures: Reviewed and discussed; Patient has declined additional pharmacological measures. He would like to try supplements and a cream.   A. Trial with Rheumate one tablet once daily  B. Start pyridoxine  100 mg one tablet by mouth daily take for 30 days, #30, no refills  C. Start alpha lipoid acid 2091-1178 mg per day divided into 3 doses  D. Trial with prilocaine 2%, lidocaine 10%, cyclobenzaprine 4%, capsaicin 0.001% and mannitol 20% cream, apply 1 to 2 grams of cream to the affected areas every 4 to 6 hours as needed  4. Long-term rehabilitation efforts:  A. The patient does not have a history of falls. I did complete a risk assessment for falls.   B. Patient will start a comprehensive physical therapy program at [facility] for upper body strengthening/posture correction, neurodynamics, ultrasound, E-STIM, myofascial release, cupping, dry needling, home exercise program  C. Continue HEP  D. Contrast therapy: Apply ice-packs for 15-20 minutes, followed by heating pads for 15-20 minutes to affected area   E. Dragan Shultz  reports that he has never smoked. He has never used smokeless tobacco.   5. The patient and his family have been instructed to contact my office with any questions or difficulties. The patient understands the plan and agrees to proceed accordingly.    The patient has a documented plan of care to address chronic pain. Dragan Shultz reports a pain score of  5/10.  Given his pain assessment as noted, treatment options were discussed and the following options were decided upon as a follow-up plan to address the patient's pain: continuation of current treatment plan for pain, educational materials on pain management, home exercises and therapy, prescription for non-opiod analgesics, referral to Physical Therapy, referral to specialist for assistance in pain treatment guidance, steroid injections, use of non-medical modalities (ice, heat, stretching and/or behavior modifications) and Interventional pain management measures.            Pain Management Panel    There is no flowsheet data to display.          PAZ query complete. PAZ reviewed by Ludy  Isidro CORNELL MD.     Pain Medications             acetaminophen (TYLENOL) 500 MG tablet Take 500 mg by mouth Every 6 (Six) Hours As Needed for Mild Pain.           Please note that portions of this note were completed with a voice recognition program.     Isidro Boston MD    Patient Care Team:  Robin Morataya PA as PCP - General (Physician Assistant)  Isidro Boston MD as Consulting Physician (Pain Medicine)     No orders of the defined types were placed in this encounter.        No future appointments.

## 2023-02-22 PROBLEM — M50.30 DDD (DEGENERATIVE DISC DISEASE), CERVICAL: Status: ACTIVE | Noted: 2023-02-22

## 2023-02-22 PROBLEM — M43.12 SPONDYLOLISTHESIS OF CERVICAL REGION: Status: ACTIVE | Noted: 2023-02-22

## 2023-02-22 PROBLEM — M47.812 CERVICAL SPONDYLOSIS WITHOUT MYELOPATHY: Status: ACTIVE | Noted: 2023-02-22

## 2023-02-23 ENCOUNTER — OFFICE VISIT (OUTPATIENT)
Dept: PAIN MEDICINE | Facility: CLINIC | Age: 53
End: 2023-02-23
Payer: MEDICAID

## 2023-02-23 VITALS
BODY MASS INDEX: 26.68 KG/M2 | RESPIRATION RATE: 16 BRPM | WEIGHT: 170 LBS | HEIGHT: 67 IN | SYSTOLIC BLOOD PRESSURE: 110 MMHG | HEART RATE: 74 BPM | DIASTOLIC BLOOD PRESSURE: 60 MMHG | OXYGEN SATURATION: 98 %

## 2023-02-23 DIAGNOSIS — M50.123 CERVICAL DISC DISORDER AT C6-C7 LEVEL WITH RADICULOPATHY: ICD-10-CM

## 2023-02-23 DIAGNOSIS — M50.123 CERVICAL DISC DISORDER AT C6-C7 LEVEL WITH RADICULOPATHY: Primary | ICD-10-CM

## 2023-02-23 DIAGNOSIS — M47.812 CERVICAL SPONDYLOSIS WITHOUT MYELOPATHY: ICD-10-CM

## 2023-02-23 DIAGNOSIS — M43.12 SPONDYLOLISTHESIS OF CERVICAL REGION: ICD-10-CM

## 2023-02-23 DIAGNOSIS — G54.0 TOS (THORACIC OUTLET SYNDROME): ICD-10-CM

## 2023-02-23 DIAGNOSIS — M99.71 CONN TISS AND DISC STENOSIS OF INTVRT FORAMIN OF CERV REGION: ICD-10-CM

## 2023-02-23 DIAGNOSIS — M50.30 DDD (DEGENERATIVE DISC DISEASE), CERVICAL: ICD-10-CM

## 2023-02-23 PROCEDURE — 99204 OFFICE O/P NEW MOD 45 MIN: CPT | Performed by: ANESTHESIOLOGY

## 2023-02-23 RX ORDER — MULTIVITAMIN WITH IRON
100 TABLET ORAL DAILY
Qty: 30 TABLET | Refills: 0 | Status: SHIPPED | OUTPATIENT
Start: 2023-02-23

## 2023-02-23 RX ORDER — ST. JOHN'S WORT 300 MG
400 CAPSULE ORAL 3 TIMES DAILY
Qty: 180 CAPSULE | Refills: 5 | Status: SHIPPED | OUTPATIENT
Start: 2023-02-23

## 2023-02-23 RX ORDER — ACETAMINOPHEN 500 MG
500 TABLET ORAL EVERY 6 HOURS PRN
COMMUNITY

## 2023-02-23 RX ORDER — ME-TETRAHYDROFOLATE/B12/HRB236 1-1-500 MG
1 CAPSULE ORAL DAILY
Qty: 90 CAPSULE | Refills: 1 | Status: SHIPPED | OUTPATIENT
Start: 2023-02-23

## 2023-03-10 ENCOUNTER — TELEPHONE (OUTPATIENT)
Dept: PAIN MEDICINE | Facility: CLINIC | Age: 53
End: 2023-03-10
Payer: MEDICAID

## 2023-03-10 NOTE — TELEPHONE ENCOUNTER
Patient's son called needing an explanation of how to perform contrast therapy when using compounding cream. I educated him about the process and advised him to call the office if he has any further questions

## 2023-03-15 ENCOUNTER — OUTSIDE FACILITY SERVICE (OUTPATIENT)
Dept: PAIN MEDICINE | Facility: CLINIC | Age: 53
End: 2023-03-15
Payer: MEDICAID

## 2023-03-15 PROCEDURE — 99152 MOD SED SAME PHYS/QHP 5/>YRS: CPT | Performed by: ANESTHESIOLOGY

## 2023-03-15 PROCEDURE — 62321 NJX INTERLAMINAR CRV/THRC: CPT | Performed by: ANESTHESIOLOGY

## 2023-03-16 ENCOUNTER — HOSPITAL ENCOUNTER (OUTPATIENT)
Dept: CARDIOLOGY | Facility: HOSPITAL | Age: 53
Discharge: HOME | End: 2023-03-16
Payer: MEDICAID

## 2023-03-16 VITALS — WEIGHT: 169.97 LBS | BODY MASS INDEX: 26.68 KG/M2 | HEIGHT: 67 IN

## 2023-03-16 VITALS — BODY MASS INDEX: 26.68 KG/M2 | WEIGHT: 169.97 LBS | HEIGHT: 67 IN

## 2023-03-16 DIAGNOSIS — G54.0 TOS (THORACIC OUTLET SYNDROME): ICD-10-CM

## 2023-03-16 PROCEDURE — 93923 UPR/LXTR ART STDY 3+ LVLS: CPT

## 2023-03-16 PROCEDURE — 93880 EXTRACRANIAL BILAT STUDY: CPT

## 2023-03-16 PROCEDURE — 93923 UPR/LXTR ART STDY 3+ LVLS: CPT | Performed by: INTERNAL MEDICINE

## 2023-03-16 PROCEDURE — 93880 EXTRACRANIAL BILAT STUDY: CPT | Performed by: INTERNAL MEDICINE

## 2023-03-17 ENCOUNTER — TELEPHONE (OUTPATIENT)
Dept: PAIN MEDICINE | Facility: CLINIC | Age: 53
End: 2023-03-17
Payer: MEDICAID

## 2023-03-20 LAB
BH CV UPPER ARTERIAL LEFT 1ST DIGIT SYS MAX: 121
BH CV UPPER ARTERIAL LEFT FBI 1ST DIGIT RATIO: 0.8
BH CV UPPER ARTERIAL LEFT WBI RATIO: 1.2
BH CV UPPER ARTERIAL RIGHT 1ST DIGIT SYS MAX: 123
BH CV UPPER ARTERIAL RIGHT FBI 1ST DIGIT RATIO: 0.9
BH CV UPPER ARTERIAL RIGHT WBI RATIO: 1.1
BH CV XLRA MEAS LEFT DIST CCA EDV: 23.5 CM/SEC
BH CV XLRA MEAS LEFT DIST CCA PSV: 58.6 CM/SEC
BH CV XLRA MEAS LEFT DIST ICA EDV: 40.5 CM/SEC
BH CV XLRA MEAS LEFT DIST ICA PSV: 81.5 CM/SEC
BH CV XLRA MEAS LEFT ICA/CCA RATIO: 1.06
BH CV XLRA MEAS LEFT MID CCA EDV: 26.4 CM/SEC
BH CV XLRA MEAS LEFT MID CCA PSV: 71.5 CM/SEC
BH CV XLRA MEAS LEFT MID ICA EDV: 35.2 CM/SEC
BH CV XLRA MEAS LEFT MID ICA PSV: 75.6 CM/SEC
BH CV XLRA MEAS LEFT PROX CCA EDV: 28.1 CM/SEC
BH CV XLRA MEAS LEFT PROX CCA PSV: 87.4 CM/SEC
BH CV XLRA MEAS LEFT PROX ECA EDV: 11 CM/SEC
BH CV XLRA MEAS LEFT PROX ECA PSV: 52.6 CM/SEC
BH CV XLRA MEAS LEFT PROX ICA EDV: 32.2 CM/SEC
BH CV XLRA MEAS LEFT PROX ICA PSV: 66.3 CM/SEC
BH CV XLRA MEAS LEFT PROX SCLA PSV: 115 CM/SEC
BH CV XLRA MEAS LEFT VERTEBRAL A EDV: 15.3 CM/SEC
BH CV XLRA MEAS LEFT VERTEBRAL A PSV: 39.3 CM/SEC
BH CV XLRA MEAS RIGHT DIST CCA EDV: 18.5 CM/SEC
BH CV XLRA MEAS RIGHT DIST CCA PSV: 57.8 CM/SEC
BH CV XLRA MEAS RIGHT DIST ICA EDV: 32.6 CM/SEC
BH CV XLRA MEAS RIGHT DIST ICA PSV: 67.6 CM/SEC
BH CV XLRA MEAS RIGHT ICA/CCA RATIO: 0.95
BH CV XLRA MEAS RIGHT MID CCA EDV: 19.3 CM/SEC
BH CV XLRA MEAS RIGHT MID CCA PSV: 68 CM/SEC
BH CV XLRA MEAS RIGHT MID ICA EDV: 27.5 CM/SEC
BH CV XLRA MEAS RIGHT MID ICA PSV: 64.8 CM/SEC
BH CV XLRA MEAS RIGHT PROX CCA EDV: 19.9 CM/SEC
BH CV XLRA MEAS RIGHT PROX CCA PSV: 73.9 CM/SEC
BH CV XLRA MEAS RIGHT PROX ECA EDV: 13.8 CM/SEC
BH CV XLRA MEAS RIGHT PROX ECA PSV: 61.7 CM/SEC
BH CV XLRA MEAS RIGHT PROX ICA EDV: 25.5 CM/SEC
BH CV XLRA MEAS RIGHT PROX ICA PSV: 61.3 CM/SEC
BH CV XLRA MEAS RIGHT PROX SCLA PSV: 137 CM/SEC
BH CV XLRA MEAS RIGHT VERTEBRAL A EDV: 20.4 CM/SEC
BH CV XLRA MEAS RIGHT VERTEBRAL A PSV: 47.5 CM/SEC
MAXIMAL PREDICTED HEART RATE: 167 BPM
MAXIMAL PREDICTED HEART RATE: 167 BPM
STRESS TARGET HR: 142 BPM
STRESS TARGET HR: 142 BPM
UPPER ARTERIAL LEFT ARM BRACHIAL SYS MAX: 135 MMHG
UPPER ARTERIAL LEFT ARM RADIAL SYS MAX: 155 MMHG
UPPER ARTERIAL LEFT ARM ULNAR SYS MAX: 158 MMHG
UPPER ARTERIAL RIGHT ARM BRACHIAL SYS MAX: 137 MMHG
UPPER ARTERIAL RIGHT ARM RADIAL SYS MAX: 166 MMHG
UPPER ARTERIAL RIGHT ARM ULNAR SYS MAX: 164 MMHG

## 2023-03-23 DIAGNOSIS — G54.0 TOS (THORACIC OUTLET SYNDROME): Primary | ICD-10-CM

## 2023-03-27 DIAGNOSIS — G54.0 TOS (THORACIC OUTLET SYNDROME): Primary | ICD-10-CM

## 2023-03-30 ENCOUNTER — TELEPHONE (OUTPATIENT)
Dept: PAIN MEDICINE | Facility: CLINIC | Age: 53
End: 2023-03-30
Payer: MEDICAID

## 2023-03-30 NOTE — TELEPHONE ENCOUNTER
"Patient called because he wanted a \"plain english\" explanation of his latest results.     He also wanted an explanation as to why he was scheduled with Licha Maciel. I explained that most of Dr. Boston's after procedure follow ups are done with her.   "

## 2023-05-04 ENCOUNTER — OFFICE VISIT (OUTPATIENT)
Dept: CARDIAC SURGERY | Facility: CLINIC | Age: 53
End: 2023-05-04
Payer: MEDICAID

## 2023-05-04 VITALS
BODY MASS INDEX: 25.07 KG/M2 | DIASTOLIC BLOOD PRESSURE: 69 MMHG | HEART RATE: 84 BPM | SYSTOLIC BLOOD PRESSURE: 100 MMHG | HEIGHT: 68 IN | OXYGEN SATURATION: 99 % | TEMPERATURE: 98 F | WEIGHT: 165.4 LBS

## 2023-05-04 DIAGNOSIS — G54.0 TOS (THORACIC OUTLET SYNDROME): ICD-10-CM

## 2023-05-04 DIAGNOSIS — M50.30 DDD (DEGENERATIVE DISC DISEASE), CERVICAL: Primary | ICD-10-CM

## 2023-05-04 NOTE — PROGRESS NOTES
05/04/2023  Patient Information  Dragan Shultz                                                                                          [address]  [facility] [id number]   1970  'PCP/Referring Physician'  Robin Morataya PA  [phone]  Isidro Boston MD  [phone]  Chief Complaint   Patient presents with   • Thoracic Outlet Syndrome     Referred by Dr. Boston for thoracic outlet syndrome. Patient has tingling in upper right shoulder and has to stop movement for pain to pass.        History of Present Illness: 53-year-old man with history of chronic neck and right shoulder pain in the setting of cervical radiculopathy who presents to the outpatient cardiovascular surgery clinic as a referral from Dr. Boston interventional pain specialist for evaluation of possible neurogenic thoracic outlet syndrome.  The patient began experiencing neck and right shoulder pain beginning in October 2022 which she attributed to an overuse injury.  He was frequently positioning his hand behind his right neck and his elbow up in the air for relief.  He is a  by trade.  His pain became severe and debilitating in November 2022, and he was referred to a shoulder specialist orthopedic surgeon who referred him to Dr. Boston for management.  The patient reports that he has had minimal symptoms and feels much better since his steroid injection at his neck and physical therapy.  He is accompanied today by his son who is fluent in Gujarati and English, and we are also accompanied by a professional .    Patient Active Problem List   Diagnosis   • Moderate persistent asthma   • Allergic rhinitis   • Biceps strain, left, initial encounter   • Left elbow pain   • Left wrist pain   • Impingement syndrome of left shoulder   • Bursitis of left shoulder   • Neck pain   • Impingement syndrome of right shoulder   • Cervical radiculopathy   • Right shoulder pain   • Cervical disc disorder at  C6-C7 level with radiculopathy   • Conn tiss and disc stenosis of intvrt foramin of cerv region   • Spondylolisthesis of cervical region   • Cervical spondylosis without myelopathy   • DDD (degenerative disc disease), cervical   • TOS (thoracic outlet syndrome)     Past Medical History:   Diagnosis Date   • Arthritis    • Hyperlipidemia    • Pre-diabetes      History reviewed. No pertinent surgical history.    Current Outpatient Medications:   •  acetaminophen (TYLENOL) 500 MG tablet, Take 1 tablet by mouth Every 6 (Six) Hours As Needed for Mild Pain., Disp: , Rfl:   •  Cyanocobalamin (B-12 PO), Take  by mouth., Disp: , Rfl:   •  fluticasone (FLONASE) 50 MCG/ACT nasal spray, 2 sprays into the nostril(s) as directed by provider Daily., Disp: 16 g, Rfl: 11  •  Fluticasone-Salmeterol (Advair Diskus) 250-50 MCG/ACT DISKUS, Inhale 1 puff 2 (Two) Times a Day., Disp: 60 each, Rfl: 2  •  Gel Base gel, 2 g 4 (Four) Times a Day. prilocaine 2%, lidocaine 10%, cyclobenzaprine 4%, capsaicin 0.001% and mannitol 20% cream, Disp: 240 g, Rfl: 5  •  MAGNESIUM PO, Take  by mouth., Disp: , Rfl:   •  vitamin D (ERGOCALCIFEROL) 1.25 MG (56237 UT) capsule capsule, Take 1 capsule by mouth 1 (One) Time Per Week., Disp: 5 capsule, Rfl: 5  •  albuterol sulfate  (90 Base) MCG/ACT inhaler, Inhale 2 puffs Every 4 (Four) Hours As Needed for Wheezing., Disp: 6.7 g, Rfl: 11  •  Alpha Lipoic Acid 200 MG capsule, Take 400 mg by mouth 3 (Three) Times a Day., Disp: 180 capsule, Rfl: 5  •  cetirizine (zyrTEC) 10 MG tablet, Take 1 tablet by mouth Daily., Disp: 90 tablet, Rfl: 3  •  Dietary Management Product (Rheumate) capsule, Take 1 capsule by mouth Daily., Disp: 90 capsule, Rfl: 1  •  vitamin B-6 (PYRIDOXINE) 100 MG tablet, Take 1 tablet by mouth Daily., Disp: 30 tablet, Rfl: 0  No Known Allergies  Social History     Socioeconomic History   • Marital status:    • Number of children: 2   Tobacco Use   • Smoking status: Never   • Smokeless  tobacco: Never   Vaping Use   • Vaping Use: Never used   Substance and Sexual Activity   • Alcohol use: No   • Drug use: No   • Sexual activity: Defer     Family History   Problem Relation Age of Onset   • Hyperlipidemia Father    • Hypertension Father    • Diabetes Father    • Stroke Father    • Diabetes Paternal Grandfather    • Hypertension Paternal Grandfather    • Hyperlipidemia Paternal Grandfather    • Stroke Paternal Grandfather      Review of Systems   Constitutional: Negative for chills, decreased appetite, diaphoresis, fever, malaise/fatigue, night sweats, weight gain and weight loss.   HENT: Negative for hoarse voice.    Eyes: Negative for blurred vision, double vision and visual disturbance.   Cardiovascular: Negative for chest pain, claudication, dyspnea on exertion, irregular heartbeat, leg swelling, near-syncope, orthopnea, palpitations, paroxysmal nocturnal dyspnea and syncope.   Respiratory: Negative for cough, hemoptysis, shortness of breath, sputum production and wheezing.    Hematologic/Lymphatic: Negative for adenopathy and bleeding problem. Does not bruise/bleed easily.   Skin: Negative for color change, nail changes, poor wound healing and rash.   Musculoskeletal: Positive for back pain. Negative for falls and muscle cramps.   Gastrointestinal: Negative for abdominal pain, dysphagia and heartburn.   Genitourinary: Negative for flank pain.   Neurological: Negative for brief paralysis, disturbances in coordination, dizziness, focal weakness, headaches, light-headedness, loss of balance, numbness, paresthesias, sensory change, vertigo and weakness.   Psychiatric/Behavioral: Negative for depression and suicidal ideas.   Allergic/Immunologic: Positive for environmental allergies. Negative for persistent infections.     Vitals:    05/04/23 0920   BP: 100/69   BP Location: Right arm   Patient Position: Sitting   Pulse: 84   Temp: 98 °F (36.7 °C)   SpO2: 99%   Weight: 75 kg (165 lb 6.4 oz)   Height:  "172.7 cm (68\")      Physical Exam  General no acute distress, pleasant, interactive  Head normocephalic, atraumatic  Eyes clear sclerae  ENT no discharge, neck supple  Mouth mucous membranes moist  Cardiac regular rate rhythm, no murmurs rubs gallops  Vascular warm well perfused x4 extremities  Pulmonary lungs clear to auscultation bilaterally  Abdomen soft nontender nondistended  Lymphatic no edema bilateral lower extremities  Neurological grossly intact, he is able to elevate the right arm above his head freely without limitation or hesitation, and is freely able to move the right shoulder with full range of motion and placed the right hand behind his head and posterior neck without issue.  He does sense a slight pulling or tightness sensation in the neck when he flexes his neck touching his chin to his chest and turns the head slightly to the left  Psychological appropriate  Dermatological no lesions in sun exposed areas  Musculoskeletal normal tone and bulk    The ROS, past medical history, surgical history, family history, social history and vitals were reviewed by myself and corrected as needed.      Labs/Imaging:  I reviewed the report of the patient's MRI and x-rays which are concerning for degenerative disc disease in the cervical spine.  I also reviewed the report of the patient's extracranial arterial studies, which are notable for a diminution of the Doppler flow with the patient's hands and arms elevated above his shoulders.    Assessment/Plan: 53-year-old man with history of chronic neck and right shoulder pain in the setting of cervical radiculopathy who presents to the outpatient cardiovascular surgery clinic for evaluation of possible neurogenic thoracic outlet syndrome.  I had a pleasant and thorough conversation with the patient, his son, and an  regarding his history of symptoms and provocative maneuvers.  Since his recent spinal steroid injection, the patient has almost no discomfort " or physical limitation with his right arm and shoulder.  Based on his physical exam and history, as well as the MRI and x-ray findings, I am concerned that his problems are most likely due to spinal stenosis and degenerative disc disease rather than symptomatic thoracic outlet syndrome on the right side.  While it may be true that he does have evidence of the diminution of his arterial Doppler signals with provocative maneuvers bilaterally, I think it is more likely that his symptoms are from a central spinal etiology.  I would like the patient to be evaluated by spine surgeon or expert specialist, and I invited the patient to return to my clinic as needed or upon the recommendation by the spine surgeon pending the result of that evaluation.    I would like to thank you very much for the opportunity to participate in the care of this very pleasant gentleman.    Javier Cook M.D., R.P.V.I.  Cardiothoracic and Vascular Surgeon  [facility]          Patient Active Problem List   Diagnosis   • Moderate persistent asthma   • Allergic rhinitis   • Biceps strain, left, initial encounter   • Left elbow pain   • Left wrist pain   • Impingement syndrome of left shoulder   • Bursitis of left shoulder   • Neck pain   • Impingement syndrome of right shoulder   • Cervical radiculopathy   • Right shoulder pain   • Cervical disc disorder at C6-C7 level with radiculopathy   • Conn tiss and disc stenosis of intvrt foramin of cerv region   • Spondylolisthesis of cervical region   • Cervical spondylosis without myelopathy   • DDD (degenerative disc disease), cervical   • TOS (thoracic outlet syndrome)

## 2023-06-14 ENCOUNTER — OFFICE VISIT (OUTPATIENT)
Dept: PAIN MEDICINE | Facility: CLINIC | Age: 53
End: 2023-06-14
Payer: MEDICAID

## 2023-06-14 VITALS — WEIGHT: 167.4 LBS | HEIGHT: 67 IN | BODY MASS INDEX: 26.27 KG/M2

## 2023-06-14 DIAGNOSIS — G54.0 TOS (THORACIC OUTLET SYNDROME): ICD-10-CM

## 2023-06-14 DIAGNOSIS — M50.30 DDD (DEGENERATIVE DISC DISEASE), CERVICAL: ICD-10-CM

## 2023-06-14 DIAGNOSIS — M50.123 CERVICAL DISC DISORDER AT C6-C7 LEVEL WITH RADICULOPATHY: ICD-10-CM

## 2023-06-14 DIAGNOSIS — M99.71 CONN TISS AND DISC STENOSIS OF INTVRT FORAMIN OF CERV REGION: ICD-10-CM

## 2023-06-14 DIAGNOSIS — M47.812 CERVICAL SPONDYLOSIS WITHOUT MYELOPATHY: ICD-10-CM

## 2023-06-14 DIAGNOSIS — M43.12 SPONDYLOLISTHESIS OF CERVICAL REGION: ICD-10-CM

## 2023-06-14 PROCEDURE — 1159F MED LIST DOCD IN RCRD: CPT | Performed by: NURSE PRACTITIONER

## 2023-06-14 PROCEDURE — 1160F RVW MEDS BY RX/DR IN RCRD: CPT | Performed by: NURSE PRACTITIONER

## 2023-06-14 PROCEDURE — 99213 OFFICE O/P EST LOW 20 MIN: CPT | Performed by: NURSE PRACTITIONER

## 2023-06-14 PROCEDURE — 1126F AMNT PAIN NOTED NONE PRSNT: CPT | Performed by: NURSE PRACTITIONER

## 2023-06-14 NOTE — PROGRESS NOTES
"Chief Complaint: \"Neck and right shoulder blade pain\"          History of Present Illness:   Patient: Mr. Dragan Shultz, 53 y.o. male originally referred by Dr. Leroy Sellers in consultation for chronic intractable right shoulder pain.  Patient reports a longstanding history of chronic right shoulder pain.  His son is present today to assist with translation, although he does speak some English.  In 2019 he saw Dr. Law for left shoulder problems, he was then evaluated by orthopedics in April 2022 for severe right shoulder pain associated with neck pain.  He saw Dr. Leroy Sellers on 12/8/2022 and was found not to be a surgical candidate.  He obtained an MRI of the cervical spine which demonstrated mild retrolisthesis of C6 on C7.  Multilevel spondylosis.  At C5-C6 there is a disc osteophyte complex formation eccentric to the left with facet arthritis.  There is moderate spinal stenosis with left neuroforaminal stenosis.  At C6-C7 there is a disc osteophyte complex with facet hypertrophy and moderate spinal canal with moderate bilateral neuroforaminal stenosis.  Electrodiagnostic studies demonstrates a right C7 radiculopathy as well as a mild right carpal tunnel syndrome.  We last saw him on March 15, 2023, when he underwent a cervical epidural steroid injection, from which he reports experiencing almost 100% pain relief and functional improvement that is ongoing.  He has been participating in physical therapy with improvements.  He was also referred to cardiothoracic surgery in consultation for thoracic outlet syndrome.  He was evaluated by Dr. Javier Cook on 5/4/2023, and his conclusion was that patient's symptoms were present with provocative maneuvers, but did not appear to be the source of his pain, he also suspected symptoms were emanating from his cervical spine.  He suggested consultation with a surgeon if symptoms continued.  Although, patient was doing significantly well at that point.  Pain " Description: Previously a Constant right-sided neck and right shoulder blade pain with intermittent exacerbation, described as aching and dull sensation.   Radiation of Pain: The pain no longer radiates into the right shoulder blade  Pain intensity today: 0/10   Average pain intensity last week: 2/10  Pain intensity ranges from: 0/10 to 5/10  Aggravating factors: Pain increases with extension of the cervical spine and sometimes when using his right arm  Alleviating factors: Pain decreases with sitting down, lying down  Associated Symptoms:   Patient denies reports pain, numbness, tingling and weakness in the right upper extremity. Denies left-sided symptoms  Patient denies any new bladder or bowel problems.   Patient denies difficulties with his balance or recent falls.   Pain interferes with general activities and affects patient's quality of life  Pain does not interfere with sleep   Stiffness: No    Review of previous therapies and additional medical records:  Dragan Shultz has already failed the following measures, including:   Conservative Measures: Oral analgesics, topical analgesics, physical therapy (last visit 2 weeks ago for 20 visits), physical therapist directed home exercise program HEP (ongoing), ice, heat,   Interventional Measures: 3/15/2023: BERNARD  Surgical Measures: No history of previous cervical spine or shoulder surgery   Dragan Shultz underwent orthopedic surgical consultation with Dr. Leroy Sellers on 12/08/2022, and was found not to be a surgical candidate.  Dragan Shultz presents with significant comorbidities including hyperlipidemia, pre-diabetes, asthma  In terms of current analgesics, Dragan Shultz takes: Tylenol  I have reviewed Juan Ramon Report consistent with medication reconciliation.  SOAPP: Low Risk       Global Pain Scale 02-23 2023 06-14 2023         Pain 10 4         Feelings 0 0         Clinical outcomes 5 2         Activities  5 0         GPS Total: 20 6           NECK PAIN DISABILITY INDEX QUESTIONNAIRE  DATE 02-23 2023 06-14 2023          Pain intensity  0: No pain  1: Mild  2: Moderate  3: Fairly severe  4: very severe  5: Worst imaginable 1  1          Personal Care   0: I can look after myself normally without causing extra pain.  1: I can look after myself normally, but it causes extra pain.  2: It is painful to look after myself and I am slow and careful.  3: I need some help, but manage most of my personal care.  4: I need help every day in most aspects of self-care.  5: I do not get dressed; I wash with difficulty and stay in bed. 1  0          Lifting  0: I can lift heavy weights without extra pain.  1: I can lift heavy weights, but it gives extra pain.  2: Pain prevents me from lifting heavy weights off the floor but I can manage if they are conveniently positioned, for example, on a table.  3: Pain prevents me from lifting heavy weights, but I can manage light to medium weights if they are conveniently positioned.  4: I can lift very light weights.  5: I cannot lift or carry anything at all. 4 3          Reading  0: I can read as much as I want to with no pain in my neck.  1: I can read as much as I want to with slight pain in my neck.  2: I can read as much as I want to with moderate pain in my neck.  3: I cannot read as much as I want because of moderate pain in my neck.  4: I cannot read as much as I want because of severe pain in my neck.  5: I cannot read at all. 1 1          Headaches  0: I have no headaches at all.  1: I have slight headaches which come infrequently.  2: I have moderate headaches which come infrequently.  3: I have moderate headaches which come frequently.  4: I have severe headaches which come frequently.  5: I have headaches almost all the time. 0 0          Concentration  0: I can concentrate fully when I want to with no difficulty.  1: I can concentrate fully when I want to with slight  difficulty.  2: I have a fair degree of difficulty in concentrating when I want to.  3: I have a lot of difficulty in concentrating when I want to.  4: I have a great deal of difficulty in concentrating when I want to.  5: I cannot concentrate at all. 0 0          Work  0: I can do as much work as I want to.  1: I can only do my usual work, but no more.  2: I can do most of my usual work, but no more.  3: I cannot do my usual work.  4: I can hardly do any work at all.  5: I cannot do any work at all.   3  2          Driving  0: I can drive my car without any neck pain.  1: I can drive my car as long as I want with slight pain in my neck.  2: I can drive my car as long as I want with moderate pain in my   neck.  3: I cannot drive my car as long as I want because of moderate pain   in my neck.  4: I can hardly drive at all because of severe pain in my neck.  5: I cannot drive my car at all. 2 1          Sleeping  0: I have no trouble sleeping.  1: My sleep is slightly disturbed (less than 1 hour sleepless).  2: My sleep is mildly disturbed (1-2 hours sleepless).  3: My sleep is moderately disturbed (2-3 hours sleepless).  4: My sleep is greatly disturbed (3-5 hours sleepless).  5: My sleep is completely disturbed (5-7 hours) 2 0          Recreation  0: I am able to engage in all of my recreational activities with no neck pain at all.  1: I am able to engage in all of my recreational activities with some pain in my neck.  2: I am able to engage in most, but not all of my recreational activities because of pain in my neck.  3: I am able to engage in a few of my recreational activities because of pain in my neck.  4: I can hardly do any recreational activities because of pain in my neck.  5: I cannot do any recreational activities at all. 2 0          TOTAL SCORE 16 8                Review of New Diagnostic Studies:  Duplex carotid ultrasound and duplex arterial ultrasound upper extremity stress with TOS maneuvers  03/16/2023: Carotids and vertebral arteries: Normal, positive with TOS maneuvers    Review of Diagnostic Studies:  EMG/NCV of bilateral upper extremities 02/03/2023:  right C7 radiculopathy.  Mild right carpal tunnel syndrome  Cervical x-rays 12/8/2022:  degenerative disc disease most notable at C4-C5, C5-C6, C6-C7  Right shoulder x-rays 12/30/2022: humeral head centered in the glenohumeral joint.  No evidence of calcific tendinitis.  Moderate degenerative AC joint changes.  MRI of the cervical spine without contrast 1/23/2023: straightening of the cervical lordosis.  Mild retrolisthesis of C6 on C7.  Spinal cord caliber and signal are normal.  Multilevel spondylosis.    C2-C3: No significant canal or foraminal stenosis  C3-C4: Disc osteophyte complex, facet hypertrophy. Mild canal stenosis and left foraminal stenosis  C4-C5: Disc osteophyte complex, facet hypertrophy. Mild to moderate canal stenosis and bilateral foraminal stenosis  C5-C6: Disc osteophyte complex eccentric to the left, facet arthropathy. Moderate canal stenosis and left foraminal stenosis  C6-C7: Disc osteophyte complex, facet hypertrophy. Moderate canal and bilateral neuroforaminal stenosis     Review of Systems   All other systems reviewed and are negative.      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      Family History   Problem Relation Age of Onset    Hyperlipidemia Father     Hypertension Father     Diabetes Father     Stroke Father     Diabetes Paternal Grandfather     Hypertension Paternal Grandfather     Hyperlipidemia Paternal Grandfather     Stroke Paternal Grandfather          Social History     Socioeconomic History    Marital status:     Number of children: 2   Tobacco Use    Smoking status: Never    Smokeless tobacco: Never   Vaping Use    Vaping Use: Never used   Substance and Sexual Activity    Alcohol use: No    Drug use: No    Sexual activity: Defer           Current Outpatient Medications:     acetaminophen (TYLENOL) 500 MG tablet, Take 1 tablet by mouth Every 6 (Six) Hours As Needed for Mild Pain., Disp: , Rfl:     albuterol sulfate  (90 Base) MCG/ACT inhaler, Inhale 2 puffs Every 4 (Four) Hours As Needed for Wheezing., Disp: 6.7 g, Rfl: 11    Alpha Lipoic Acid 200 MG capsule, Take 400 mg by mouth 3 (Three) Times a Day., Disp: 180 capsule, Rfl: 5    cetirizine (zyrTEC) 10 MG tablet, Take 1 tablet by mouth Daily., Disp: 90 tablet, Rfl: 3    Cyanocobalamin (B-12 PO), Take  by mouth., Disp: , Rfl:     Dietary Management Product (Rheumate) capsule, Take 1 capsule by mouth Daily., Disp: 90 capsule, Rfl: 1    fluticasone (FLONASE) 50 MCG/ACT nasal spray, 2 sprays into the nostril(s) as directed by provider Daily., Disp: 16 g, Rfl: 11    Fluticasone-Salmeterol (Advair Diskus) 250-50 MCG/ACT DISKUS, Inhale 1 puff 2 (Two) Times a Day., Disp: 60 each, Rfl: 2    Gel Base gel, 2 g 4 (Four) Times a Day. prilocaine 2%, lidocaine 10%, cyclobenzaprine 4%, capsaicin 0.001% and mannitol 20% cream, Disp: 240 g, Rfl: 5    MAGNESIUM PO, Take  by mouth., Disp: , Rfl:     vitamin B-6 (PYRIDOXINE) 100 MG tablet, Take 1 tablet by mouth Daily., Disp: 30 tablet, Rfl: 0    vitamin D (ERGOCALCIFEROL) 1.25 MG (66284 UT) capsule  "capsule, Take 1 capsule by mouth 1 (One) Time Per Week., Disp: 5 capsule, Rfl: 5      No Known Allergies      Ht 170.2 cm (67\")   Wt 75.9 kg (167 lb 6.4 oz)   BMI 26.22 kg/m²       Physical Exam:  Constitutional: Patient appears well-developed, well-nourished, well-hydrated  HEENT: Head: Normocephalic and atraumatic  Eyes: Conjunctivae and lids are normal  Pupils: Equal, round, reactive to light  Neck: Trachea normal. Neck supple. No JVD present.   Lymphatic: No cervical adenopathy  Musculoskeletal   Gait and station: Gait evaluation demonstrated a normal gait. Able to walk on heels, toes, tandem walking   Cervical spine: Passive and active range of motion are almost full and without pain. Extension, flexion, lateral flexion, rotation of the cervical spine did not increase or reproduce pain. Cervical facet joint loading maneuvers are negative.  Muscles: Presence of active trigger points: none  Shoulders: The range of motion of the glenohumeral joints is full and without pain. Rotator cuff strength is 5/5.   Positive Adson's bilaterally  Neurological:   Patient is alert and oriented to person, place, and time.   Speech: Normal.   Cortical function: Normal mental status.   Reflex Scores:  Right brachioradialis: 2+  Left brachioradialis: 2+  Right biceps: 2+  Left biceps: 2+  Right triceps: 2+  Left triceps: 2+  Right patellar: 2+  Left patellar: 2+  Right Achilles: 1+  Left Achilles: 1+  Motor strength: 5/5  Motor Tone: Normal  Involuntary movements: None.   Superficial/Primitive Reflexes: Primitive reflexes were absent.   Right Verma: Absent  Left Verma: Absent  Right ankle clonus: Absent  Left ankle clonus: Absent   Babinsky: Absent  Spurling sign: Negative. Neck tornado test: Negative. Lhermitte sign: Negative. Long tract signs: Negative.   Sensory exam: Intact to light touch, intact pain and temperature sensation, intact vibration sensation and normal proprioception.   Coordination: Normal finger to nose and " heel to shin. Normal balance and negative Romberg's sign   Skin and subcutaneous tissue: Skin is warm and intact. No rash noted. No cyanosis.   Psychiatric: Judgment and insight: Normal. Recent and remote memory: Intact. Mood and affect: Normal.       ASSESSMENT:   1. Cervical disc disorder at C6-C7 level with radiculopathy    2. Spondylolisthesis of cervical region    3. Conn tiss and disc stenosis of intvrt foramin of cerv region    4. DDD (degenerative disc disease), cervical    5. Cervical spondylosis without myelopathy    6. TOS (thoracic outlet syndrome)          PLAN/MEDICAL DECISION MAKING:  Mr. Dragan Shultz, 53 y.o. male reports a longstanding history of chronic right shoulder pain.  His son is present today to assist with translation, although he does speak some English.  In 2019 he saw Dr. Law for left shoulder problems, he was then evaluated by orthopedics in April 2022 for severe right shoulder pain associated with neck pain.  He saw Dr. Leroy Sellers on 12/8/2022 and was found not to be a surgical candidate.  He obtained an MRI of the cervical spine which demonstrated mild retrolisthesis of C6 on C7.  Multilevel spondylosis.  At C5-C6 there is a disc osteophyte complex formation eccentric to the left with facet arthritis.  There is moderate spinal stenosis with left neuroforaminal stenosis.  At C6-C7 there is a disc osteophyte complex with facet hypertrophy and moderate spinal canal with moderate bilateral neuroforaminal stenosis.  Electrodiagnostic studies demonstrates a right C7 radiculopathy as well as a mild right carpal tunnel syndrome.  We last saw him on March 15, 2023, when he underwent a cervical epidural steroid injection, from which he reports experiencing pain relief and functional improvement lasting.  He has been participating in physical therapy with improvements.  He was also referred to cardiothoracic surgery in consultation for thoracic outlet syndrome.  He was  evaluated by Dr. Javier Riojas on 5/4/2023, and his conclusion was that patient's symptoms were present with provocative maneuvers, but did not appear to be the source of his pain, he also suspected symptoms were emanating from his cervical spine.  He suggested consultation with a surgeon if symptoms continued.  Although, patient was doing significantly well at that point.  He is currently doing significantly better, with almost complete reduction of his symptoms.  He will follow-up as needed. I have reviewed all available patient's medical records as well as previous therapies as referenced above. I had a lengthy conversation with Mr. Dragan Shultz regarding his chronic pain condition and potential therapeutic options including risks, benefits, alternative therapies, to name a few.  Therefore, I have proposed the following plan:  1. Interventional pain management measures: None indicated at this time.  Depending on continued response we may consider repeating cervical epidural steroid injection by right paramedian interlaminar with addition of hyaluronidase. We may repeat cervical epidural steroid injection by right paramedian interlaminar approach with addition of hyaluronidase versus diagnostic and therapeutic right C6-C7 transforaminal epidural steroid injection depending on patient's outcome.  As per current guidelines, epidurals will be limited to a maximum of 4 sessions per spinal region in a rolling twelve (12) month period. Continuation of epidural steroid injections over 12 months would only be considered under the following provisions;  Patient is a high-risk surgical candidate, or the patient does not desire surgery, or recurrence of pain in the same location relieved with ESIs for at least three months and epidural provides at least 50% sustained improvement of pain and/or 50% objective improvement in function (using same scale as baseline)  Pain is severe enough to cause a significant  degree of functional disability or vocational disability  The primary care provider will be notified regarding continuation of procedures and repeat steroid use   If he continues to struggle with pain, I we refer him to Dr. Senior for neurosurgical consultation  2. Pharmacological measures: Reviewed and discussed; Patient has declined additional pharmacological measures. He would like to try supplements and a cream.   A. Continue Rheumate one tablet once daily  B. Continue prilocaine 2%, lidocaine 10%, cyclobenzaprine 4%, capsaicin 0.001% and mannitol 20% cream, apply 1 to 2 grams of cream to the affected areas every 4 to 6 hours as needed  4. Long-term rehabilitation efforts:  A. The patient does not have a history of falls. I did complete a risk assessment for falls.   B. Patient will start a comprehensive physical therapy program for upper body strengthening/posture correction, neurodynamics, ultrasound, E-STIM, myofascial release, cupping, dry needling, home exercise program  C. Continue HEP  D. Contrast therapy: Apply ice-packs for 15-20 minutes, followed by heating pads for 15-20 minutes to affected area   E. Dragan Shultz  reports that he has never smoked. He has never used smokeless tobacco.   5. The patient and his family have been instructed to contact my office with any questions or difficulties. The patient understands the plan and agrees to proceed accordingly.        Pain Medications               acetaminophen (TYLENOL) 500 MG tablet Take 1 tablet by mouth Every 6 (Six) Hours As Needed for Mild Pain.             Please note that portions of this note were completed with a voice recognition program.     MARGI Ortiz    Patient Care Team:  Rboin Morataya PA as PCP - General (Physician Assistant)  Isidro Boston MD as Consulting Physician (Pain Medicine)  Licha Maciel APRN as Nurse Practitioner (Nurse Practitioner)     No orders of the defined types were placed in this  encounter.        No future appointments.

## 2023-09-27 DIAGNOSIS — J30.2 SEASONAL ALLERGIES: ICD-10-CM

## 2023-09-27 RX ORDER — FLUTICASONE PROPIONATE 50 MCG
2 SPRAY, SUSPENSION (ML) NASAL DAILY
Qty: 48 G | Refills: 0 | OUTPATIENT
Start: 2023-09-27

## 2023-09-29 ENCOUNTER — TELEPHONE (OUTPATIENT)
Dept: FAMILY MEDICINE CLINIC | Facility: CLINIC | Age: 53
End: 2023-09-29
Payer: MEDICAID

## 2023-09-29 NOTE — TELEPHONE ENCOUNTER
Caller: ShultzRadha    Relationship: Emergency Contact    Best call back number: [phone]     What are your current symptoms: PINK EYE     How long have you been experiencing symptoms:    2 DAYS     Have you had these symptoms before:    [x] Yes  [] No    Have you been treated for these symptoms before:   [x] Yes  [] No    If a prescription is needed, what is your preferred pharmacy and phone number:      [address] [facility] - [phone] [facility] [phone]  [phone]     Additional notes:  PATIENT'S (SON) RADHA STATED THAT PATIENT WOULD LIKE FOR MEDICATION TO BE CALLED INTO THE PHARMACY TO HELP WITH PATIENT'S CURRENT PINK EYE

## 2023-10-02 ENCOUNTER — OFFICE VISIT (OUTPATIENT)
Dept: FAMILY MEDICINE CLINIC | Facility: CLINIC | Age: 53
End: 2023-10-02
Payer: MEDICAID

## 2023-10-02 VITALS
SYSTOLIC BLOOD PRESSURE: 96 MMHG | DIASTOLIC BLOOD PRESSURE: 66 MMHG | WEIGHT: 164 LBS | BODY MASS INDEX: 25.74 KG/M2 | OXYGEN SATURATION: 97 % | HEIGHT: 67 IN | TEMPERATURE: 98.4 F | HEART RATE: 102 BPM | RESPIRATION RATE: 16 BRPM

## 2023-10-02 DIAGNOSIS — J20.9 ACUTE BRONCHITIS, UNSPECIFIED ORGANISM: ICD-10-CM

## 2023-10-02 DIAGNOSIS — Z13.6 ENCOUNTER FOR LIPID SCREENING FOR CARDIOVASCULAR DISEASE: ICD-10-CM

## 2023-10-02 DIAGNOSIS — Z13.1 SCREENING FOR DIABETES MELLITUS: ICD-10-CM

## 2023-10-02 DIAGNOSIS — Z51.81 MEDICATION MONITORING ENCOUNTER: ICD-10-CM

## 2023-10-02 DIAGNOSIS — E55.9 VITAMIN D INSUFFICIENCY: ICD-10-CM

## 2023-10-02 DIAGNOSIS — R05.1 ACUTE COUGH: ICD-10-CM

## 2023-10-02 DIAGNOSIS — J45.41 MODERATE PERSISTENT ASTHMA WITH ACUTE EXACERBATION: ICD-10-CM

## 2023-10-02 DIAGNOSIS — Z00.00 ENCOUNTER FOR WELL ADULT EXAM WITHOUT ABNORMAL FINDINGS: Primary | ICD-10-CM

## 2023-10-02 DIAGNOSIS — J30.2 SEASONAL ALLERGIES: ICD-10-CM

## 2023-10-02 DIAGNOSIS — Z12.5 SCREENING FOR MALIGNANT NEOPLASM OF PROSTATE: ICD-10-CM

## 2023-10-02 DIAGNOSIS — Z13.220 ENCOUNTER FOR LIPID SCREENING FOR CARDIOVASCULAR DISEASE: ICD-10-CM

## 2023-10-02 LAB
EXPIRATION DATE: NORMAL
EXPIRATION DATE: NORMAL
FLUAV AG UPPER RESP QL IA.RAPID: NOT DETECTED
FLUBV AG UPPER RESP QL IA.RAPID: NOT DETECTED
INTERNAL CONTROL: NORMAL
INTERNAL CONTROL: NORMAL
Lab: NORMAL
Lab: NORMAL
S PYO AG THROAT QL: NEGATIVE
SARS-COV-2 AG UPPER RESP QL IA.RAPID: NOT DETECTED

## 2023-10-02 PROCEDURE — 99396 PREV VISIT EST AGE 40-64: CPT | Performed by: PHYSICIAN ASSISTANT

## 2023-10-02 PROCEDURE — 87880 STREP A ASSAY W/OPTIC: CPT | Performed by: PHYSICIAN ASSISTANT

## 2023-10-02 PROCEDURE — 87428 SARSCOV & INF VIR A&B AG IA: CPT | Performed by: PHYSICIAN ASSISTANT

## 2023-10-02 RX ORDER — BROMPHENIRAMINE MALEATE, PSEUDOEPHEDRINE HYDROCHLORIDE, AND DEXTROMETHORPHAN HYDROBROMIDE 2; 30; 10 MG/5ML; MG/5ML; MG/5ML
5 SYRUP ORAL 4 TIMES DAILY PRN
Qty: 200 ML | Refills: 0 | Status: SHIPPED | OUTPATIENT
Start: 2023-10-02

## 2023-10-02 RX ORDER — PREDNISONE 20 MG/1
20 TABLET ORAL 2 TIMES DAILY
Qty: 10 TABLET | Refills: 0 | Status: SHIPPED | OUTPATIENT
Start: 2023-10-02

## 2023-10-02 RX ORDER — FLUTICASONE PROPIONATE AND SALMETEROL 250; 50 UG/1; UG/1
1 POWDER RESPIRATORY (INHALATION)
Qty: 60 EACH | Refills: 11 | Status: SHIPPED | OUTPATIENT
Start: 2023-10-02

## 2023-10-02 NOTE — PROGRESS NOTES
"Shauna Shultz is a 53 y.o. male.     History of Present Illness   Pt presents for annual exam and with CC of productive cough, SOB and chills since last Thursday   No known sick contacts   Has asthma. Has been out of maintenance inhaler   Requesting refills   Has been taking Z guicho prescribed by a provider in [place]     Would like screening labs   Cologuard in 2021 and normal. Interested in possible colonoscopy next year   The following portions of the patient's history were reviewed and updated as appropriate: allergies, current medications, past family history, past medical history, past social history, past surgical history, and problem list.    Review of Systems  As noted per HPI     Objective   Blood pressure 96/66, pulse 102, temperature 98.4 °F (36.9 °C), resp. rate 16, height 170.2 cm (67\"), weight 74.4 kg (164 lb), SpO2 97 %.   Physical Exam  Vitals and nursing note reviewed.   Constitutional:       Appearance: Normal appearance. He is well-developed.   HENT:      Head: Normocephalic and atraumatic.      Right Ear: Tympanic membrane, ear canal and external ear normal.      Left Ear: Tympanic membrane, ear canal and external ear normal.      Nose: Nose normal.      Mouth/Throat:      Pharynx: No oropharyngeal exudate or posterior oropharyngeal erythema.   Eyes:      Conjunctiva/sclera: Conjunctivae normal.   Neck:      Thyroid: No thyromegaly.      Trachea: No tracheal deviation.   Cardiovascular:      Rate and Rhythm: Normal rate and regular rhythm.      Heart sounds: Normal heart sounds.   Pulmonary:      Effort: Pulmonary effort is normal. No respiratory distress.      Breath sounds: Wheezing and rhonchi present. No rales.   Chest:      Chest wall: No tenderness.   Musculoskeletal:         General: No tenderness or deformity. Normal range of motion.      Cervical back: Normal range of motion and neck supple.   Lymphadenopathy:      Cervical: No cervical adenopathy.   Skin:     " General: Skin is warm and dry.   Neurological:      Mental Status: He is alert and oriented to person, place, and time.   Psychiatric:         Mood and Affect: Mood normal.         Behavior: Behavior normal.         Thought Content: Thought content normal.         Judgment: Judgment normal.       Assessment & Plan   Diagnoses and all orders for this visit:    1. Encounter for well adult exam without abnormal findings (Primary)  -     CBC w AUTO Differential  -     Comprehensive metabolic panel  -     Lipid Panel  -     Hemoglobin A1c  -     TSH  -     Iron Profile  -     Vitamin D,25-Hydroxy  -     Vitamin B12  -     Folate  -     PSA SCREENING  -     Magnesium    2. Acute bronchitis, unspecified organism  -     POCT rapid strep A  -     POCT SARS-CoV-2 Antigen GARRET + Flu  -     CBC w AUTO Differential  -     Comprehensive metabolic panel  -     brompheniramine-pseudoephedrine-DM 30-2-10 MG/5ML syrup; Take 5 mL by mouth 4 (Four) Times a Day As Needed for Congestion or Cough.  Dispense: 200 mL; Refill: 0  -     predniSONE (DELTASONE) 20 MG tablet; Take 1 tablet by mouth 2 (Two) Times a Day.  Dispense: 10 tablet; Refill: 0    3. Screening for diabetes mellitus  -     Hemoglobin A1c    4. Encounter for lipid screening for cardiovascular disease  -     Lipid Panel    5. Vitamin D insufficiency  -     Vitamin D,25-Hydroxy    6. Screening for malignant neoplasm of prostate  -     PSA SCREENING    7. Medication monitoring encounter  -     Iron Profile  -     Vitamin B12  -     Folate  -     Magnesium    8. Moderate persistent asthma with acute exacerbation  -     predniSONE (DELTASONE) 20 MG tablet; Take 1 tablet by mouth 2 (Two) Times a Day.  Dispense: 10 tablet; Refill: 0  -     Fluticasone-Salmeterol (Advair Diskus) 250-50 MCG/ACT DISKUS; Inhale 1 puff 2 (Two) Times a Day.  Dispense: 60 each; Refill: 11    9. Acute cough  -     brompheniramine-pseudoephedrine-DM 30-2-10 MG/5ML syrup; Take 5 mL by mouth 4 (Four) Times a  Day As Needed for Congestion or Cough.  Dispense: 200 mL; Refill: 0      Strep, flu and COVID negative. Pt aware   Begin treatment as noted. May complete z guicho he has already started. F/u INB     Labs as outlined in plan     Counseling was given to patient for the following topics: diagnostic results, instructions for management, risk factor reductions, patient and family education, and impressions . Total time of the encounter was 15 minutes and 7.5 minutes was spent counseling.

## 2023-10-03 LAB
25(OH)D3+25(OH)D2 SERPL-MCNC: 22.6 NG/ML (ref 30–100)
ALBUMIN SERPL-MCNC: 4.1 G/DL (ref 3.8–4.9)
ALBUMIN/GLOB SERPL: 1.6 {RATIO} (ref 1.2–2.2)
ALP SERPL-CCNC: 53 IU/L (ref 44–121)
ALT SERPL-CCNC: 12 IU/L (ref 0–44)
AST SERPL-CCNC: 11 IU/L (ref 0–40)
BASOPHILS # BLD AUTO: 0 X10E3/UL (ref 0–0.2)
BASOPHILS NFR BLD AUTO: 0 %
BILIRUB SERPL-MCNC: 0.4 MG/DL (ref 0–1.2)
BUN SERPL-MCNC: 10 MG/DL (ref 6–24)
BUN/CREAT SERPL: 13 (ref 9–20)
CALCIUM SERPL-MCNC: 9 MG/DL (ref 8.7–10.2)
CHLORIDE SERPL-SCNC: 103 MMOL/L (ref 96–106)
CHOLEST SERPL-MCNC: 222 MG/DL (ref 100–199)
CO2 SERPL-SCNC: 21 MMOL/L (ref 20–29)
CREAT SERPL-MCNC: 0.79 MG/DL (ref 0.76–1.27)
EGFRCR SERPLBLD CKD-EPI 2021: 106 ML/MIN/1.73
EOSINOPHIL # BLD AUTO: 0.2 X10E3/UL (ref 0–0.4)
EOSINOPHIL NFR BLD AUTO: 2 %
ERYTHROCYTE [DISTWIDTH] IN BLOOD BY AUTOMATED COUNT: 13.4 % (ref 11.6–15.4)
FOLATE SERPL-MCNC: 16.8 NG/ML
GLOBULIN SER CALC-MCNC: 2.5 G/DL (ref 1.5–4.5)
GLUCOSE SERPL-MCNC: 128 MG/DL (ref 70–99)
HBA1C MFR BLD: 6.9 % (ref 4.8–5.6)
HCT VFR BLD AUTO: 44 % (ref 37.5–51)
HDLC SERPL-MCNC: 44 MG/DL
HGB BLD-MCNC: 14.6 G/DL (ref 13–17.7)
IMM GRANULOCYTES # BLD AUTO: 0.1 X10E3/UL (ref 0–0.1)
IMM GRANULOCYTES NFR BLD AUTO: 1 %
IRON SATN MFR SERPL: 20 % (ref 15–55)
IRON SERPL-MCNC: 51 UG/DL (ref 38–169)
LDLC SERPL CALC-MCNC: 151 MG/DL (ref 0–99)
LYMPHOCYTES # BLD AUTO: 1.4 X10E3/UL (ref 0.7–3.1)
LYMPHOCYTES NFR BLD AUTO: 17 %
MAGNESIUM SERPL-MCNC: 2.2 MG/DL (ref 1.6–2.3)
MCH RBC QN AUTO: 28.2 PG (ref 26.6–33)
MCHC RBC AUTO-ENTMCNC: 33.2 G/DL (ref 31.5–35.7)
MCV RBC AUTO: 85 FL (ref 79–97)
MONOCYTES # BLD AUTO: 1 X10E3/UL (ref 0.1–0.9)
MONOCYTES NFR BLD AUTO: 11 %
NEUTROPHILS # BLD AUTO: 5.7 X10E3/UL (ref 1.4–7)
NEUTROPHILS NFR BLD AUTO: 69 %
PLATELET # BLD AUTO: 200 X10E3/UL (ref 150–450)
POTASSIUM SERPL-SCNC: 4.1 MMOL/L (ref 3.5–5.2)
PROT SERPL-MCNC: 6.6 G/DL (ref 6–8.5)
PSA SERPL-MCNC: 0.4 NG/ML (ref 0–4)
RBC # BLD AUTO: 5.18 X10E6/UL (ref 4.14–5.8)
SODIUM SERPL-SCNC: 138 MMOL/L (ref 134–144)
TIBC SERPL-MCNC: 252 UG/DL (ref 250–450)
TRIGL SERPL-MCNC: 148 MG/DL (ref 0–149)
TSH SERPL DL<=0.005 MIU/L-ACNC: 1.7 UIU/ML (ref 0.45–4.5)
UIBC SERPL-MCNC: 201 UG/DL (ref 111–343)
VIT B12 SERPL-MCNC: 563 PG/ML (ref 232–1245)
VLDLC SERPL CALC-MCNC: 27 MG/DL (ref 5–40)
WBC # BLD AUTO: 8.3 X10E3/UL (ref 3.4–10.8)

## 2023-10-03 RX ORDER — FLUTICASONE PROPIONATE 50 MCG
SPRAY, SUSPENSION (ML) NASAL
Qty: 48 G | Refills: 0 | OUTPATIENT
Start: 2023-10-03

## 2023-10-16 ENCOUNTER — TELEPHONE (OUTPATIENT)
Dept: FAMILY MEDICINE CLINIC | Facility: CLINIC | Age: 53
End: 2023-10-16
Payer: MEDICAID

## 2023-10-16 DIAGNOSIS — E11.9 TYPE 2 DIABETES MELLITUS WITHOUT COMPLICATION, WITHOUT LONG-TERM CURRENT USE OF INSULIN: Primary | ICD-10-CM

## 2023-10-16 DIAGNOSIS — E78.00 HYPERCHOLESTEROLEMIA: ICD-10-CM

## 2023-10-16 RX ORDER — METFORMIN HYDROCHLORIDE 500 MG/1
500 TABLET, EXTENDED RELEASE ORAL
Qty: 90 TABLET | Refills: 0 | Status: SHIPPED | OUTPATIENT
Start: 2023-10-16 | End: 2023-10-18 | Stop reason: SDUPTHER

## 2023-10-16 RX ORDER — ROSUVASTATIN CALCIUM 10 MG/1
10 TABLET, COATED ORAL DAILY
Qty: 90 TABLET | Refills: 0 | Status: SHIPPED | OUTPATIENT
Start: 2023-10-16 | End: 2023-10-18 | Stop reason: SDUPTHER

## 2023-10-16 NOTE — TELEPHONE ENCOUNTER
Pt's son Sanket stated pt would like to try the metformin and also needs refill on crestor-this was requested last week per son

## 2023-10-17 ENCOUNTER — TELEPHONE (OUTPATIENT)
Dept: FAMILY MEDICINE CLINIC | Facility: CLINIC | Age: 53
End: 2023-10-17
Payer: MEDICAID

## 2023-10-17 DIAGNOSIS — E78.00 HYPERCHOLESTEROLEMIA: ICD-10-CM

## 2023-10-17 DIAGNOSIS — E11.9 TYPE 2 DIABETES MELLITUS WITHOUT COMPLICATION, WITHOUT LONG-TERM CURRENT USE OF INSULIN: ICD-10-CM

## 2023-10-17 NOTE — TELEPHONE ENCOUNTER
Patient will be leaving out of the country and would not be back until the end of jan- requesting a longer then 90 day script if possible? Wont be able to be seen till first week of feb

## 2023-10-18 RX ORDER — ROSUVASTATIN CALCIUM 10 MG/1
10 TABLET, COATED ORAL DAILY
Qty: 90 TABLET | Refills: 1 | Status: SHIPPED | OUTPATIENT
Start: 2023-10-18

## 2023-10-18 RX ORDER — METFORMIN HYDROCHLORIDE 500 MG/1
500 TABLET, EXTENDED RELEASE ORAL
Qty: 90 TABLET | Refills: 1 | Status: SHIPPED | OUTPATIENT
Start: 2023-10-18

## 2023-10-18 NOTE — TELEPHONE ENCOUNTER
Additional refills sent to pharmacy they will need to talk with pharmacist about a vacation override or they may have to pay cash price to take additional medication with him while traveling

## 2023-10-25 DIAGNOSIS — E11.9 TYPE 2 DIABETES MELLITUS WITHOUT COMPLICATION, WITHOUT LONG-TERM CURRENT USE OF INSULIN: Primary | ICD-10-CM

## 2023-10-25 RX ORDER — LANCETS 33 GAUGE
1 EACH MISCELLANEOUS DAILY
Qty: 100 EACH | Refills: 3 | Status: SHIPPED | OUTPATIENT
Start: 2023-10-25

## 2024-02-06 ENCOUNTER — OFFICE VISIT (OUTPATIENT)
Dept: FAMILY MEDICINE CLINIC | Facility: CLINIC | Age: 54
End: 2024-02-06
Payer: MEDICAID

## 2024-02-06 VITALS
TEMPERATURE: 97.3 F | DIASTOLIC BLOOD PRESSURE: 78 MMHG | SYSTOLIC BLOOD PRESSURE: 122 MMHG | RESPIRATION RATE: 17 BRPM | HEART RATE: 78 BPM | WEIGHT: 172.2 LBS | OXYGEN SATURATION: 98 % | BODY MASS INDEX: 27.03 KG/M2 | HEIGHT: 67 IN

## 2024-02-06 DIAGNOSIS — E55.9 VITAMIN D DEFICIENCY: ICD-10-CM

## 2024-02-06 DIAGNOSIS — E11.9 TYPE 2 DIABETES MELLITUS WITHOUT COMPLICATION, WITHOUT LONG-TERM CURRENT USE OF INSULIN: Primary | ICD-10-CM

## 2024-02-06 DIAGNOSIS — E78.00 HYPERCHOLESTEROLEMIA: ICD-10-CM

## 2024-02-06 DIAGNOSIS — M25.50 ARTHRALGIA, UNSPECIFIED JOINT: ICD-10-CM

## 2024-02-06 PROCEDURE — 1159F MED LIST DOCD IN RCRD: CPT | Performed by: PHYSICIAN ASSISTANT

## 2024-02-06 PROCEDURE — 99214 OFFICE O/P EST MOD 30 MIN: CPT | Performed by: PHYSICIAN ASSISTANT

## 2024-02-06 PROCEDURE — 1160F RVW MEDS BY RX/DR IN RCRD: CPT | Performed by: PHYSICIAN ASSISTANT

## 2024-02-06 NOTE — PROGRESS NOTES
"Shauna Shultz is a 54 y.o. male.     History of Present Illness   F/u for DM and hypercholesterolemia. Has been taking metformin and crestor as directed. Due for labs   Notes nutrition got off track with family trip to [place] for his Son's wedding and has put on some weight. Also less active when in [place]     Father diagnosed with prostate cancer. PSA last visit normal. 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 Currently in their slow season, but will pick back up in April   Patient will notice wrist pain return after pulling out several loads of laundry and carrying them up and down stairs  Back gives out at times when working. Will wear back brace for a few days. One day took two tylenol. Heating pad and stretching. Once every couple of months this happens. Low back bilateral.     No radiation of pain       The following portions of the patient's history were reviewed and updated as appropriate: allergies, current medications, past family history, past medical history, past social history, past surgical history, and problem list.    Review of Systems  As noted per HPI     Objective   Blood pressure 122/78, pulse 78, temperature 97.3 °F (36.3 °C), resp. rate 17, height 170.2 cm (67\"), weight 78.1 kg (172 lb 3.2 oz), SpO2 98%.     Physical Exam  Vitals and nursing note reviewed.   Constitutional:       Appearance: Normal appearance.   Cardiovascular:      Rate and Rhythm: Normal rate and regular rhythm.   Pulmonary:      Effort: Pulmonary effort is normal.      Breath sounds: Normal breath sounds.   Musculoskeletal:      Lumbar back: Tenderness present. No bony tenderness.   Skin:     General: Skin is warm and dry.   Neurological:      Mental Status: He is alert and oriented to person, place, and time.   Psychiatric:         Mood " and Affect: Mood normal.         Behavior: Behavior normal.         Assessment & Plan   Diagnoses and all orders for this visit:    1. Type 2 diabetes mellitus without complication, without long-term current use of insulin (Primary)  -     CBC w AUTO Differential  -     Comprehensive metabolic panel  -     Hemoglobin A1c    2. Hypercholesterolemia  -     CBC w AUTO Differential  -     Comprehensive metabolic panel  -     Lipid Panel    3. Arthralgia, unspecified joint  -     Iron Profile  -     Vitamin B12  -     Folate  -     Magnesium  -     CK  -     Sedimentation rate, automated  -     C-reactive protein  -     Rheumatoid Factor  -     Uric acid    4. Vitamin D deficiency  -     Vitamin D 25 hydroxy    Labs as outlined in plan   Will hold off on sending in refills until review labs   Recommend glucosamine chondroitin supplement for joint pain. Routine stretching, and working on strengthening core and loosening hamstrings to support low back

## 2024-02-07 LAB
25(OH)D3+25(OH)D2 SERPL-MCNC: 14.9 NG/ML (ref 30–100)
ALBUMIN SERPL-MCNC: 4.4 G/DL (ref 3.8–4.9)
ALBUMIN/GLOB SERPL: 1.8 {RATIO} (ref 1.2–2.2)
ALP SERPL-CCNC: 47 IU/L (ref 44–121)
ALT SERPL-CCNC: 15 IU/L (ref 0–44)
AST SERPL-CCNC: 17 IU/L (ref 0–40)
BASOPHILS # BLD AUTO: 0.1 X10E3/UL (ref 0–0.2)
BASOPHILS NFR BLD AUTO: 1 %
BILIRUB SERPL-MCNC: 0.5 MG/DL (ref 0–1.2)
BUN SERPL-MCNC: 8 MG/DL (ref 6–24)
BUN/CREAT SERPL: 9 (ref 9–20)
CALCIUM SERPL-MCNC: 9.5 MG/DL (ref 8.7–10.2)
CHLORIDE SERPL-SCNC: 104 MMOL/L (ref 96–106)
CHOLEST SERPL-MCNC: 198 MG/DL (ref 100–199)
CK SERPL-CCNC: 193 U/L (ref 41–331)
CO2 SERPL-SCNC: 22 MMOL/L (ref 20–29)
CREAT SERPL-MCNC: 0.9 MG/DL (ref 0.76–1.27)
CRP SERPL-MCNC: 2 MG/L (ref 0–10)
EGFRCR SERPLBLD CKD-EPI 2021: 101 ML/MIN/1.73
EOSINOPHIL # BLD AUTO: 0.4 X10E3/UL (ref 0–0.4)
EOSINOPHIL NFR BLD AUTO: 10 %
ERYTHROCYTE [DISTWIDTH] IN BLOOD BY AUTOMATED COUNT: 13.1 % (ref 11.6–15.4)
ERYTHROCYTE [SEDIMENTATION RATE] IN BLOOD BY WESTERGREN METHOD: 4 MM/HR (ref 0–30)
FOLATE SERPL-MCNC: 13.3 NG/ML
GLOBULIN SER CALC-MCNC: 2.5 G/DL (ref 1.5–4.5)
GLUCOSE SERPL-MCNC: 127 MG/DL (ref 70–99)
HBA1C MFR BLD: 7.5 % (ref 4.8–5.6)
HCT VFR BLD AUTO: 45.5 % (ref 37.5–51)
HDLC SERPL-MCNC: 46 MG/DL
HGB BLD-MCNC: 14.8 G/DL (ref 13–17.7)
IMM GRANULOCYTES # BLD AUTO: 0 X10E3/UL (ref 0–0.1)
IMM GRANULOCYTES NFR BLD AUTO: 0 %
IRON SATN MFR SERPL: 38 % (ref 15–55)
IRON SERPL-MCNC: 110 UG/DL (ref 38–169)
LDLC SERPL CALC-MCNC: 126 MG/DL (ref 0–99)
LYMPHOCYTES # BLD AUTO: 1.7 X10E3/UL (ref 0.7–3.1)
LYMPHOCYTES NFR BLD AUTO: 39 %
MAGNESIUM SERPL-MCNC: 2 MG/DL (ref 1.6–2.3)
MCH RBC QN AUTO: 27.6 PG (ref 26.6–33)
MCHC RBC AUTO-ENTMCNC: 32.5 G/DL (ref 31.5–35.7)
MCV RBC AUTO: 85 FL (ref 79–97)
MONOCYTES # BLD AUTO: 0.6 X10E3/UL (ref 0.1–0.9)
MONOCYTES NFR BLD AUTO: 12 %
NEUTROPHILS # BLD AUTO: 1.7 X10E3/UL (ref 1.4–7)
NEUTROPHILS NFR BLD AUTO: 38 %
PLATELET # BLD AUTO: 188 X10E3/UL (ref 150–450)
POTASSIUM SERPL-SCNC: 5 MMOL/L (ref 3.5–5.2)
PROT SERPL-MCNC: 6.9 G/DL (ref 6–8.5)
RBC # BLD AUTO: 5.36 X10E6/UL (ref 4.14–5.8)
RHEUMATOID FACT SERPL-ACNC: <10 IU/ML
SODIUM SERPL-SCNC: 140 MMOL/L (ref 134–144)
TIBC SERPL-MCNC: 292 UG/DL (ref 250–450)
TRIGL SERPL-MCNC: 146 MG/DL (ref 0–149)
UIBC SERPL-MCNC: 182 UG/DL (ref 111–343)
URATE SERPL-MCNC: 6.7 MG/DL (ref 3.8–8.4)
VIT B12 SERPL-MCNC: 467 PG/ML (ref 232–1245)
VLDLC SERPL CALC-MCNC: 26 MG/DL (ref 5–40)
WBC # BLD AUTO: 4.5 X10E3/UL (ref 3.4–10.8)

## 2024-02-14 DIAGNOSIS — E55.9 VITAMIN D DEFICIENCY: Primary | ICD-10-CM

## 2024-02-14 RX ORDER — ERGOCALCIFEROL 1.25 MG/1
50000 CAPSULE ORAL WEEKLY
Qty: 5 CAPSULE | Refills: 5 | Status: SHIPPED | OUTPATIENT
Start: 2024-02-14

## 2024-05-08 DIAGNOSIS — J30.2 SEASONAL ALLERGIES: ICD-10-CM

## 2024-05-09 RX ORDER — FLUTICASONE PROPIONATE 50 MCG
2 SPRAY, SUSPENSION (ML) NASAL DAILY
Qty: 48 G | Refills: 0 | Status: SHIPPED | OUTPATIENT
Start: 2024-05-09

## 2024-07-12 ENCOUNTER — OFFICE VISIT (OUTPATIENT)
Dept: FAMILY MEDICINE CLINIC | Facility: CLINIC | Age: 54
End: 2024-07-12
Payer: MEDICAID

## 2024-07-12 VITALS
HEIGHT: 67 IN | SYSTOLIC BLOOD PRESSURE: 98 MMHG | RESPIRATION RATE: 14 BRPM | HEART RATE: 79 BPM | BODY MASS INDEX: 26.74 KG/M2 | WEIGHT: 170.4 LBS | OXYGEN SATURATION: 98 % | TEMPERATURE: 97.8 F | DIASTOLIC BLOOD PRESSURE: 62 MMHG

## 2024-07-12 DIAGNOSIS — M62.830 MUSCLE SPASM OF BACK: ICD-10-CM

## 2024-07-12 DIAGNOSIS — M54.16 LUMBAR BACK PAIN WITH RADICULOPATHY AFFECTING RIGHT LOWER EXTREMITY: Primary | ICD-10-CM

## 2024-07-12 PROCEDURE — 99213 OFFICE O/P EST LOW 20 MIN: CPT | Performed by: NURSE PRACTITIONER

## 2024-07-12 PROCEDURE — 3051F HG A1C>EQUAL 7.0%<8.0%: CPT | Performed by: NURSE PRACTITIONER

## 2024-07-12 PROCEDURE — 1160F RVW MEDS BY RX/DR IN RCRD: CPT | Performed by: NURSE PRACTITIONER

## 2024-07-12 PROCEDURE — 1126F AMNT PAIN NOTED NONE PRSNT: CPT | Performed by: NURSE PRACTITIONER

## 2024-07-12 PROCEDURE — 1159F MED LIST DOCD IN RCRD: CPT | Performed by: NURSE PRACTITIONER

## 2024-07-12 PROCEDURE — 96372 THER/PROPH/DIAG INJ SC/IM: CPT | Performed by: NURSE PRACTITIONER

## 2024-07-12 RX ORDER — METHOCARBAMOL 750 MG/1
750 TABLET, FILM COATED ORAL 3 TIMES DAILY
Qty: 21 TABLET | Refills: 0 | Status: SHIPPED | OUTPATIENT
Start: 2024-07-12 | End: 2024-07-19

## 2024-07-12 RX ORDER — PREDNISONE 10 MG/1
TABLET ORAL
Qty: 21 TABLET | Refills: 0 | Status: SHIPPED | OUTPATIENT
Start: 2024-07-12

## 2024-07-12 RX ORDER — KETOROLAC TROMETHAMINE 30 MG/ML
60 INJECTION, SOLUTION INTRAMUSCULAR; INTRAVENOUS ONCE
Status: COMPLETED | OUTPATIENT
Start: 2024-07-12 | End: 2024-07-12

## 2024-07-12 RX ADMIN — KETOROLAC TROMETHAMINE 60 MG: 30 INJECTION, SOLUTION INTRAMUSCULAR; INTRAVENOUS at 10:03

## 2024-07-12 NOTE — PROGRESS NOTES
Date: 2024   Patient Name: Dragan Shultz  : 1970   MRN: [id number]     Chief Complaint:    Chief Complaint   Patient presents with    Back Pain     For a week or so. Twisted getting off of toilet. He has done this 3-4 times in the last 6 months.       History of Present Illness: Dragan Shultz is a 54 y.o. male who is here today for Mid lower back that radiates down the right leg that started about 1 week ago  Taking ibuprofen and aleve back BID, aid in in some relief   No trouble using the bathroom  8/10 pain, muscle spasms  Heat and cold   Laying down helps relieve pain  Unable to bend over it causes pain       Review of Systems:   Review of Systems   Musculoskeletal:  Positive for back pain.       Past Medical History:   Past Medical History:   Diagnosis Date    Arthritis     Hyperlipidemia     Pre-diabetes        Past Surgical History: History reviewed. No pertinent surgical history.    Family History:   Family History   Problem Relation Age of Onset    Hyperlipidemia Father     Hypertension Father     Diabetes Father     Stroke Father     Diabetes Paternal Grandfather     Hypertension Paternal Grandfather     Hyperlipidemia Paternal Grandfather     Stroke Paternal Grandfather        Social History:   Social History     Socioeconomic History    Marital status:     Number of children: 2   Tobacco Use    Smoking status: Never    Smokeless tobacco: Never   Vaping Use    Vaping status: Never Used   Substance and Sexual Activity    Alcohol use: No    Drug use: No    Sexual activity: Defer       Medications:     Current Outpatient Medications:     Blood Glucose Monitoring Suppl device, Use 1 each Daily., Disp: 1 each, Rfl: 0    Fluticasone-Salmeterol (Advair Diskus) 250-50 MCG/ACT DISKUS, Inhale 1 puff 2 (Two) Times a Day., Disp: 60 each, Rfl: 11    metFORMIN ER (GLUCOPHAGE-XR) 500 MG 24 hr tablet, Take 1 tablet by mouth Daily With Breakfast., Disp: 90 tablet, Rfl:  "1    OneTouch Delica Lancets 33G misc, Use 1 each Daily. To check blood sugar, Disp: 100 each, Rfl: 3    rosuvastatin (Crestor) 10 MG tablet, Take 1 tablet by mouth Daily., Disp: 90 tablet, Rfl: 1    glucose blood test strip, Use as instructed to check blood sugar once daily (Patient not taking: Reported on 7/12/2024), Disp: 90 each, Rfl: 3    methocarbamol (ROBAXIN) 750 MG tablet, Take 1 tablet by mouth 3 (Three) Times a Day for 7 days., Disp: 21 tablet, Rfl: 0    predniSONE (DELTASONE) 10 MG (21) dose pack, Use as directed on package, Disp: 21 tablet, Rfl: 0  No current facility-administered medications for this visit.    Allergies:   No Known Allergies      Physical Exam:  Vital Signs:   Vitals:    07/12/24 0935   BP: 98/62   Pulse: 79   Resp: 14   Temp: 97.8 °F (36.6 °C)   SpO2: 98%   Weight: 77.3 kg (170 lb 6.4 oz)   Height: 170.2 cm (67\")     Body mass index is 26.69 kg/m².     Physical Exam  Vitals and nursing note reviewed.   Constitutional:       Appearance: Normal appearance.   HENT:      Head: Normocephalic and atraumatic.   Cardiovascular:      Rate and Rhythm: Normal rate and regular rhythm.   Pulmonary:      Effort: Pulmonary effort is normal.      Breath sounds: Normal breath sounds.   Abdominal:      General: Bowel sounds are normal.   Musculoskeletal:      Cervical back: Normal.      Thoracic back: Normal.      Lumbar back: Tenderness present. Decreased range of motion.      Comments: Lumbar paraspinal muscle tenderness   Skin:     General: Skin is warm.   Neurological:      General: No focal deficit present.      Mental Status: He is alert and oriented to person, place, and time.   Psychiatric:         Mood and Affect: Mood normal.           Assessment/Plan:   Diagnoses and all orders for this visit:    1. Lumbar back pain with radiculopathy affecting right lower extremity (Primary)  -     ketorolac (TORADOL) injection 60 mg  -     predniSONE (DELTASONE) 10 MG (21) dose pack; Use as directed on " package  Dispense: 21 tablet; Refill: 0    2. Muscle spasm of back  -     ketorolac (TORADOL) injection 60 mg  -     methocarbamol (ROBAXIN) 750 MG tablet; Take 1 tablet by mouth 3 (Three) Times a Day for 7 days.  Dispense: 21 tablet; Refill: 0       Back pain education  Back exercising and stretching discussed in clinic  Monitor for worsening symptoms  Go to the ER with severe symptoms, loss of function of lower extremities, numbness or tingling present in lower extremities, loss of bowel or bladder function  Take medications as prescribed within chart.      Follow Up:   Return if symptoms worsen or fail to improve.    Janie Moon. MARGI   [facility]

## 2024-08-15 DIAGNOSIS — E11.9 TYPE 2 DIABETES MELLITUS WITHOUT COMPLICATION, WITHOUT LONG-TERM CURRENT USE OF INSULIN: ICD-10-CM

## 2024-08-15 RX ORDER — METFORMIN HYDROCHLORIDE 500 MG/1
500 TABLET, EXTENDED RELEASE ORAL
Qty: 90 TABLET | Refills: 0 | Status: SHIPPED | OUTPATIENT
Start: 2024-08-15

## 2024-09-10 DIAGNOSIS — E78.00 HYPERCHOLESTEROLEMIA: ICD-10-CM

## 2024-09-10 NOTE — TELEPHONE ENCOUNTER
Caller: Louis Shultze    Relationship: Emergency Contact    Best call back number: [phone]    Requested Prescriptions:   Requested Prescriptions     Pending Prescriptions Disp Refills    rosuvastatin (Crestor) 10 MG tablet 90 tablet 1     Sig: Take 1 tablet by mouth Daily.        Pharmacy where request should be sent: [facility] [address] [place] [address] [id number]  - [phone]  - [phone] FX     Last office visit with prescribing clinician: 2/6/2024   Last telemedicine visit with prescribing clinician: Visit date not found   Next office visit with prescribing clinician: Visit date not found     Additional details provided by patient: PATIENT NEEDS REFILL    Does the patient have less than a 3 day supply:  [x] Yes  [] No    Would you like a call back once the refill request has been completed: [] Yes [x] No    If the office needs to give you a call back, can they leave a voicemail: [] Yes [x] No    Shaw Carty Rep   09/10/24 09:45 EDT

## 2024-09-11 RX ORDER — ROSUVASTATIN CALCIUM 10 MG/1
10 TABLET, COATED ORAL DAILY
Qty: 90 TABLET | Refills: 1 | Status: SHIPPED | OUTPATIENT
Start: 2024-09-11

## 2024-09-12 DIAGNOSIS — Z12.11 SCREENING FOR MALIGNANT NEOPLASM OF COLON: Primary | ICD-10-CM

## 2024-11-06 ENCOUNTER — TELEPHONE (OUTPATIENT)
Dept: FAMILY MEDICINE CLINIC | Facility: CLINIC | Age: 54
End: 2024-11-06

## 2024-11-06 NOTE — TELEPHONE ENCOUNTER
Caller: Sanket Shultz    Relationship: Emergency Contact    Best call back number: [phone]     What medication are you requesting: COUGH SYRUP     What are your current symptoms: COUGH    How long have you been experiencing symptoms: ABOUT A WEEK AND A HALF     Have you had these symptoms before:    [x] Yes  [] No    Have you been treated for these symptoms before:   [x] Yes  [] No    If a prescription is needed, what is your preferred pharmacy and phone number: [facility] [address] [place] [address] [facility] [phone] [facility] [phone] FX     Additional notes:

## 2024-11-06 NOTE — TELEPHONE ENCOUNTER
SPOKE WITH PATIENTS SON, STATED THAT HE WOULD SPEAK WITH HIS FATHER AND SEE WHAT HE WOULD LIKE TO DO REGARDING MAKING AN APPOINTMENT

## 2024-11-20 ENCOUNTER — OFFICE VISIT (OUTPATIENT)
Dept: FAMILY MEDICINE CLINIC | Facility: CLINIC | Age: 54
End: 2024-11-20
Payer: MEDICAID

## 2024-11-20 VITALS
HEIGHT: 67 IN | HEART RATE: 90 BPM | BODY MASS INDEX: 26.06 KG/M2 | TEMPERATURE: 97 F | DIASTOLIC BLOOD PRESSURE: 62 MMHG | RESPIRATION RATE: 12 BRPM | SYSTOLIC BLOOD PRESSURE: 100 MMHG | WEIGHT: 166 LBS | OXYGEN SATURATION: 96 %

## 2024-11-20 DIAGNOSIS — E11.9 TYPE 2 DIABETES MELLITUS WITHOUT COMPLICATION, WITHOUT LONG-TERM CURRENT USE OF INSULIN: ICD-10-CM

## 2024-11-20 DIAGNOSIS — M54.6 ACUTE MIDLINE THORACIC BACK PAIN: Primary | ICD-10-CM

## 2024-11-20 LAB
EXPIRATION DATE: ABNORMAL
EXPIRATION DATE: NORMAL
HBA1C MFR BLD: 7.7 % (ref 4.5–5.7)
Lab: ABNORMAL
Lab: NORMAL
POC CREATININE URINE: 50
POC MICROALBUMIN URINE: 10

## 2024-11-20 PROCEDURE — 1126F AMNT PAIN NOTED NONE PRSNT: CPT | Performed by: PHYSICIAN ASSISTANT

## 2024-11-20 PROCEDURE — 83036 HEMOGLOBIN GLYCOSYLATED A1C: CPT | Performed by: PHYSICIAN ASSISTANT

## 2024-11-20 PROCEDURE — 99213 OFFICE O/P EST LOW 20 MIN: CPT | Performed by: PHYSICIAN ASSISTANT

## 2024-11-20 PROCEDURE — 3051F HG A1C>EQUAL 7.0%<8.0%: CPT | Performed by: PHYSICIAN ASSISTANT

## 2024-11-20 PROCEDURE — 82044 UR ALBUMIN SEMIQUANTITATIVE: CPT | Performed by: PHYSICIAN ASSISTANT

## 2024-11-20 NOTE — PROGRESS NOTES
"Shauna Shultz is a 54 y.o. male.     History of Present Illness   History of Present Illness  The patient presents for evaluation of multiple medical concerns. He is accompanied by an adult male.    He reports that his right shoulder pain has completely subsided, but he is now experiencing similar discomfort in his left shoulder, reminiscent of the pain he had in 2019. Despite the pain, he maintains a good range of motion. The pain is more pronounced in his thoracic back and radiates downwards, but does not seem to originate from the shoulder joint. He is not experiencing any chest pain, indigestion, or reflux. Occasionally, he experiences a burning sensation that extends down his arm. He describes the pain as continuous, with occasional shooting sensations. He mentions that his mattress, which is 4 years old, causes him no discomfort. He took arthritis medication after breakfast today, which has provided some relief. He expresses a desire to return to the same physical therapist he saw previously.    He continues to take metformin for his diabetes. He acknowledges that his blood sugar levels have increased due to a diet high in vegetables and sweet fruits. He expresses a desire to maintain his A1c level at 6.5.       The following portions of the patient's history were reviewed and updated as appropriate: allergies, current medications, past family history, past medical history, past social history, past surgical history, and problem list.    Review of Systems  As noted per HPI     Objective   Blood pressure 100/62, pulse 90, temperature 97 °F (36.1 °C), resp. rate 12, height 170.2 cm (67\"), weight 75.3 kg (166 lb), SpO2 96%. Body mass index is 26 kg/m².   Physical Exam  Constitutional:       Appearance: Normal appearance.   Cardiovascular:      Rate and Rhythm: Normal rate and regular rhythm.   Pulmonary:      Effort: Pulmonary effort is normal.      Breath sounds: Normal breath sounds. "   Musculoskeletal:      Left shoulder: Normal.      Thoracic back: Tenderness and bony tenderness present. No spasms. Normal range of motion.   Skin:     General: Skin is warm and dry.   Neurological:      Mental Status: He is alert and oriented to person, place, and time.         Results  Laboratory Studies  A1c is 7.7.    Assessment & Plan   Assessment & Plan  1. Left shoulder pain.  The pain in his left shoulder could be due to a pinched nerve. An x-ray of the mid back will be ordered to assess the extent of degenerative changes. Physical therapy will be initiated at [facility] ([place], [place].    2. Diabetes Mellitus.  His A1c level is currently at 7.7. He is advised to modify his diet to manage his blood sugar levels. If there is no improvement in his blood sugar levels with dietary changes, an increase in his metformin dosage may be considered. An annual check-up with comprehensive blood work will be scheduled in 3 months.       Diagnoses and all orders for this visit:    1. Acute midline thoracic back pain (Primary)  -     XR Spine Thoracic 3 View  -     Ambulatory Referral to Physical Therapy for Evaluation & Treatment    2. Type 2 diabetes mellitus without complication, without long-term current use of insulin  -     POC Glycosylated Hemoglobin (Hb A1C)               Patient or patient representative verbalized consent for the use of Ambient Listening during the visit with  JESSY Cox for chart documentation. 11/20/2024  12:22 EST

## 2024-12-09 DIAGNOSIS — E11.9 TYPE 2 DIABETES MELLITUS WITHOUT COMPLICATION, WITHOUT LONG-TERM CURRENT USE OF INSULIN: ICD-10-CM

## 2024-12-09 RX ORDER — METFORMIN HYDROCHLORIDE 500 MG/1
500 TABLET, EXTENDED RELEASE ORAL
Qty: 90 TABLET | Refills: 0 | Status: SHIPPED | OUTPATIENT
Start: 2024-12-09

## 2025-02-25 ENCOUNTER — OFFICE VISIT (OUTPATIENT)
Dept: FAMILY MEDICINE CLINIC | Facility: CLINIC | Age: 55
End: 2025-02-25
Payer: MEDICAID

## 2025-02-25 VITALS
HEIGHT: 67 IN | RESPIRATION RATE: 18 BRPM | DIASTOLIC BLOOD PRESSURE: 60 MMHG | TEMPERATURE: 97.1 F | HEART RATE: 82 BPM | WEIGHT: 167 LBS | BODY MASS INDEX: 26.21 KG/M2 | SYSTOLIC BLOOD PRESSURE: 98 MMHG

## 2025-02-25 DIAGNOSIS — E55.9 VITAMIN D INSUFFICIENCY: ICD-10-CM

## 2025-02-25 DIAGNOSIS — Z78.9 HEPATITIS B VACCINATION STATUS UNKNOWN: ICD-10-CM

## 2025-02-25 DIAGNOSIS — Z12.5 SCREENING FOR MALIGNANT NEOPLASM OF PROSTATE: ICD-10-CM

## 2025-02-25 DIAGNOSIS — J45.41 MODERATE PERSISTENT ASTHMA WITH ACUTE EXACERBATION: ICD-10-CM

## 2025-02-25 DIAGNOSIS — Z00.00 ENCOUNTER FOR WELL ADULT EXAM WITHOUT ABNORMAL FINDINGS: Primary | ICD-10-CM

## 2025-02-25 DIAGNOSIS — E11.9 TYPE 2 DIABETES MELLITUS WITHOUT COMPLICATION, WITHOUT LONG-TERM CURRENT USE OF INSULIN: ICD-10-CM

## 2025-02-25 DIAGNOSIS — E78.00 HYPERCHOLESTEROLEMIA: ICD-10-CM

## 2025-02-25 LAB
EXPIRATION DATE: NORMAL
Lab: NORMAL
POC ALBUMIN, URINE: 30 MG/L
POC CREATININE, URINE: 200 MG/DL
POC URINE ALB/CREA RATIO: NORMAL

## 2025-02-25 PROCEDURE — 1126F AMNT PAIN NOTED NONE PRSNT: CPT | Performed by: PHYSICIAN ASSISTANT

## 2025-02-25 PROCEDURE — 82044 UR ALBUMIN SEMIQUANTITATIVE: CPT | Performed by: PHYSICIAN ASSISTANT

## 2025-02-25 PROCEDURE — 82570 ASSAY OF URINE CREATININE: CPT | Performed by: PHYSICIAN ASSISTANT

## 2025-02-25 PROCEDURE — 99396 PREV VISIT EST AGE 40-64: CPT | Performed by: PHYSICIAN ASSISTANT

## 2025-02-25 RX ORDER — ROSUVASTATIN CALCIUM 10 MG/1
10 TABLET, COATED ORAL DAILY
Qty: 90 TABLET | Refills: 3 | Status: SHIPPED | OUTPATIENT
Start: 2025-02-25

## 2025-02-25 RX ORDER — FLUTICASONE PROPIONATE AND SALMETEROL 250; 50 UG/1; UG/1
1 POWDER RESPIRATORY (INHALATION)
Qty: 60 EACH | Refills: 11 | Status: SHIPPED | OUTPATIENT
Start: 2025-02-25

## 2025-02-25 NOTE — PROGRESS NOTES
Shauna Shultz is a 55 y.o. male.     History of Present Illness   History of Present Illness  The patient is a 55-year-old male who presents for an annual physical. He is following up for type 2 diabetes, hypercholesterolemia, and vitamin D insufficiency. He is up to date on Cologuard screening, which was negative. He is accompanied by a family member who helps provide interpretation.    He has been managing his back pain effectively through physical therapy, with no current complaints of pain. However, he anticipates a recurrence of the pain in 04/2024 due to increased workload following the loss of an employee. He has been somewhat compliant with the stretching exercises recommended by his physical therapist.    He has been monitoring his blood glucose levels sporadically, with readings typically ranging from 120 to 160, and occasionally spiking to 200 after consuming sugary foods. He attributes his elevated blood sugar levels to his rice intake, although he has made dietary modifications by incorporating more salads and greens. He has also initiated an exercise regimen. His last ophthalmological examination was conducted in [facility] a few years ago. He reports no changes in his urinary habits or hesitancy, but acknowledges inadequate water intake. He experiences dizziness approximately once every two months. He has not utilized his emergency inhaler, but continues to use his maintenance inhaler. He plans to resume daily use of the inhaler at the onset of allergy season. He has been adhering to his metformin regimen and reports good tolerance. He is currently taking a vitamin D supplement at a dosage of 3000 units.    He reports experiencing knee pain on a daily basis, particularly when ascending or descending stairs.    MEDICATIONS  metformin, vitamin D supplement    IMMUNIZATIONS  He has received vaccinations prior to his relocation to the [place].       The following portions of the  "patient's history were reviewed and updated as appropriate: allergies, current medications, past family history, past medical history, past social history, past surgical history, and problem list.    Review of Systems  As noted per HPI     Objective   Blood pressure 98/60, pulse 82, temperature 97.1 °F (36.2 °C), resp. rate 18, height 170.2 cm (67\"), weight 75.8 kg (167 lb). Body mass index is 26.16 kg/m².   Physical Exam  Vitals and nursing note reviewed.   Constitutional:       Appearance: Normal appearance. He is well-developed.   HENT:      Head: Normocephalic and atraumatic.      Right Ear: Tympanic membrane, ear canal and external ear normal.      Left Ear: Tympanic membrane, ear canal and external ear normal.      Nose: Nose normal.      Mouth/Throat:      Pharynx: No oropharyngeal exudate.   Eyes:      Conjunctiva/sclera: Conjunctivae normal.   Neck:      Thyroid: No thyromegaly.      Trachea: No tracheal deviation.   Cardiovascular:      Rate and Rhythm: Normal rate and regular rhythm.   Pulmonary:      Effort: Pulmonary effort is normal. No respiratory distress.      Breath sounds: Normal breath sounds. No wheezing or rales.   Chest:      Chest wall: No tenderness.   Abdominal:      General: Bowel sounds are normal. There is no distension.      Palpations: Abdomen is soft. There is no mass.      Tenderness: There is no abdominal tenderness. There is no guarding or rebound.      Hernia: No hernia is present.   Musculoskeletal:      Cervical back: Normal range of motion and neck supple.   Lymphadenopathy:      Cervical: No cervical adenopathy.   Skin:     General: Skin is warm and dry.   Neurological:      Mental Status: He is alert and oriented to person, place, and time.   Psychiatric:         Mood and Affect: Mood normal.         Behavior: Behavior normal.         Thought Content: Thought content normal.         Judgment: Judgment normal.         Results      Assessment & Plan   Assessment & Plan  1. Type " 2 Diabetes Mellitus.  His blood glucose levels have been well-managed, with no weight gain observed. He is advised to continue his current diet, which includes salads and greens, and to maintain his exercise routine. He will continue his current metformin regimen. A urine sample will be collected today to assess for proteinuria and evaluate renal function. A PSA test will also be conducted. He is encouraged to undergo an annual eye examination due to his diabetic condition. A prescription for his maintenance inhaler will be renewed as it has . If his blood glucose levels continue to rise, he is agreeable to increasing his metformin dosage to two tablets daily.    2. Hypercholesterolemia.  His cholesterol levels will be monitored through blood work.    3. Vitamin D Insufficiency.  He is currently taking 3000 units of vitamin D daily. His vitamin D levels will be monitored through blood work.    4. Health Maintenance.  He is advised to consider receiving the pneumonia booster, tetanus booster, and hepatitis B series, given his increased susceptibility to infections due to his diabetic condition. He is also encouraged to stay hydrated to prevent symptoms related to low blood pressure. He is advised to contact the clinic if he experiences any bowel issues. He is up to date on his Cologuard screening, which was negative. His iron and B12 levels have consistently been within normal range, thus routine checks are not necessary unless there is a new onset of anemia. His thyroid function will be screened, and his vitamin D levels will be monitored. He will receive a call to schedule a local eye exam in [place].    5. Knee Pain.     Diagnoses and all orders for this visit:    1. Encounter for well adult exam without abnormal findings (Primary)  -     CBC w AUTO Differential  -     Comprehensive metabolic panel  -     Lipid Panel  -     Hemoglobin A1c  -     TSH  -     T4, free  -     Vitamin D 25 hydroxy  -     PSA  SCREENING  -     Hepatitis B Surface Antibody    2. Type 2 diabetes mellitus without complication, without long-term current use of insulin  -     CBC w AUTO Differential  -     Comprehensive metabolic panel  -     Lipid Panel  -     Hemoglobin A1c  -     TSH  -     T4, free  -     Ambulatory Referral for Diabetic Eye Exam-Ophthalmology  -     Albumin/Creatinine Ratio Urine    3. Hypercholesterolemia  -     CBC w AUTO Differential  -     Comprehensive metabolic panel  -     Lipid Panel  -     rosuvastatin (Crestor) 10 MG tablet; Take 1 tablet by mouth Daily.  Dispense: 90 tablet; Refill: 3    4. Screening for malignant neoplasm of prostate  -     PSA SCREENING    5. Vitamin D insufficiency  -     Vitamin D 25 hydroxy    6. Hepatitis B vaccination status unknown  -     Hepatitis B Surface Antibody    7. Moderate persistent asthma with acute exacerbation  -     Fluticasone-Salmeterol (Advair Diskus) 250-50 MCG/ACT DISKUS; Inhale 1 puff 2 (Two) Times a Day.  Dispense: 60 each; Refill: 11        Counseling was given to patient and family for the following topics: instructions for management, risk factor reductions, patient and family education, and impressions . Total time of the encounter was 15 minutes and 7.5 minutes was spent counseling.         Patient or patient representative verbalized consent for the use of Ambient Listening during the visit with  JESSY Cox for chart documentation. 2/25/2025  08:11 EST

## 2025-02-26 LAB
25(OH)D3+25(OH)D2 SERPL-MCNC: 31.6 NG/ML (ref 30–100)
ALBUMIN SERPL-MCNC: 4.1 G/DL (ref 3.5–5.2)
ALBUMIN/GLOB SERPL: 1.6 G/DL
ALP SERPL-CCNC: 49 U/L (ref 39–117)
ALT SERPL-CCNC: 11 U/L (ref 1–41)
AST SERPL-CCNC: 14 U/L (ref 1–40)
BASOPHILS # BLD AUTO: 0.02 10*3/MM3 (ref 0–0.2)
BASOPHILS NFR BLD AUTO: 0.5 % (ref 0–1.5)
BILIRUB SERPL-MCNC: 0.7 MG/DL (ref 0–1.2)
BUN SERPL-MCNC: 9 MG/DL (ref 6–20)
BUN/CREAT SERPL: 9.4 (ref 7–25)
CALCIUM SERPL-MCNC: 9.2 MG/DL (ref 8.6–10.5)
CHLORIDE SERPL-SCNC: 102 MMOL/L (ref 98–107)
CHOLEST SERPL-MCNC: 162 MG/DL (ref 0–200)
CO2 SERPL-SCNC: 24 MMOL/L (ref 22–29)
CREAT SERPL-MCNC: 0.96 MG/DL (ref 0.76–1.27)
EGFRCR SERPLBLD CKD-EPI 2021: 93.3 ML/MIN/1.73
EOSINOPHIL # BLD AUTO: 0.4 10*3/MM3 (ref 0–0.4)
EOSINOPHIL NFR BLD AUTO: 9.1 % (ref 0.3–6.2)
ERYTHROCYTE [DISTWIDTH] IN BLOOD BY AUTOMATED COUNT: 13 % (ref 12.3–15.4)
GLOBULIN SER CALC-MCNC: 2.6 GM/DL
GLUCOSE SERPL-MCNC: 139 MG/DL (ref 65–99)
HBA1C MFR BLD: 7.7 % (ref 4.8–5.6)
HBV SURFACE AB SER QL: NON REACTIVE
HCT VFR BLD AUTO: 46.1 % (ref 37.5–51)
HDLC SERPL-MCNC: 50 MG/DL (ref 40–60)
HGB BLD-MCNC: 15.5 G/DL (ref 13–17.7)
IMM GRANULOCYTES # BLD AUTO: 0.02 10*3/MM3 (ref 0–0.05)
IMM GRANULOCYTES NFR BLD AUTO: 0.5 % (ref 0–0.5)
LDLC SERPL CALC-MCNC: 91 MG/DL (ref 0–100)
LYMPHOCYTES # BLD AUTO: 1.42 10*3/MM3 (ref 0.7–3.1)
LYMPHOCYTES NFR BLD AUTO: 32.4 % (ref 19.6–45.3)
MCH RBC QN AUTO: 28.5 PG (ref 26.6–33)
MCHC RBC AUTO-ENTMCNC: 33.6 G/DL (ref 31.5–35.7)
MCV RBC AUTO: 84.9 FL (ref 79–97)
MONOCYTES # BLD AUTO: 0.57 10*3/MM3 (ref 0.1–0.9)
MONOCYTES NFR BLD AUTO: 13 % (ref 5–12)
NEUTROPHILS # BLD AUTO: 1.95 10*3/MM3 (ref 1.7–7)
NEUTROPHILS NFR BLD AUTO: 44.5 % (ref 42.7–76)
NRBC BLD AUTO-RTO: 0 /100 WBC (ref 0–0.2)
PLATELET # BLD AUTO: 196 10*3/MM3 (ref 140–450)
POTASSIUM SERPL-SCNC: 4.4 MMOL/L (ref 3.5–5.2)
PROT SERPL-MCNC: 6.7 G/DL (ref 6–8.5)
PSA SERPL-MCNC: 0.52 NG/ML (ref 0–4)
RBC # BLD AUTO: 5.43 10*6/MM3 (ref 4.14–5.8)
SODIUM SERPL-SCNC: 137 MMOL/L (ref 136–145)
T4 FREE SERPL-MCNC: 1.13 NG/DL (ref 0.92–1.68)
TRIGL SERPL-MCNC: 120 MG/DL (ref 0–150)
TSH SERPL DL<=0.005 MIU/L-ACNC: 2.2 UIU/ML (ref 0.27–4.2)
VLDLC SERPL CALC-MCNC: 21 MG/DL (ref 5–40)
WBC # BLD AUTO: 4.38 10*3/MM3 (ref 3.4–10.8)

## 2025-03-05 DIAGNOSIS — E11.9 TYPE 2 DIABETES MELLITUS WITHOUT COMPLICATION, WITHOUT LONG-TERM CURRENT USE OF INSULIN: ICD-10-CM

## 2025-03-05 RX ORDER — METFORMIN HYDROCHLORIDE 500 MG/1
500 TABLET, EXTENDED RELEASE ORAL
Qty: 90 TABLET | Refills: 0 | Status: SHIPPED | OUTPATIENT
Start: 2025-03-05 | End: 2025-03-06

## 2025-03-06 DIAGNOSIS — E11.9 TYPE 2 DIABETES MELLITUS WITHOUT COMPLICATION, WITHOUT LONG-TERM CURRENT USE OF INSULIN: Primary | ICD-10-CM

## 2025-03-06 RX ORDER — METFORMIN HYDROCHLORIDE 500 MG/1
1000 TABLET, EXTENDED RELEASE ORAL
Qty: 180 TABLET | Refills: 1 | Status: SHIPPED | OUTPATIENT
Start: 2025-03-06

## 2025-04-11 DIAGNOSIS — E11.9 TYPE 2 DIABETES MELLITUS WITHOUT COMPLICATION, WITHOUT LONG-TERM CURRENT USE OF INSULIN: ICD-10-CM

## 2025-04-11 RX ORDER — LANCETS 33 GAUGE
1 EACH MISCELLANEOUS DAILY
Qty: 100 EACH | Refills: 3 | Status: SHIPPED | OUTPATIENT
Start: 2025-04-11

## 2025-04-11 NOTE — TELEPHONE ENCOUNTER
Caller: Sanket Shultz    Relationship: Emergency Contact    Best call back number: [phone]     Requested Prescriptions:   Requested Prescriptions     Pending Prescriptions Disp Refills    glucose blood test strip 90 each 3     Sig: Use as instructed to check blood sugar once daily    OneTouch Delica Lancets 33G misc 100 each 3     Sig: Use 1 each Daily. To check blood sugar        Pharmacy where request should be sent: [address] - [phone] [facility] [phone]      Last office visit with prescribing clinician: 2/25/2025   Last telemedicine visit with prescribing clinician: Visit date not found   Next office visit with prescribing clinician: 8/27/2025     Additional details provided by patient:     Does the patient have less than a 3 day supply:  [x] Yes  [] No    Would you like a call back once the refill request has been completed: [] Yes [x] No    If the office needs to give you a call back, can they leave a voicemail: [] Yes [x] No    Shaw Magana Rep   04/11/25 09:24 EDT

## 2025-06-27 DIAGNOSIS — E11.9 TYPE 2 DIABETES MELLITUS WITHOUT COMPLICATION, WITHOUT LONG-TERM CURRENT USE OF INSULIN: ICD-10-CM

## 2025-06-27 RX ORDER — METFORMIN HYDROCHLORIDE 500 MG/1
1000 TABLET, EXTENDED RELEASE ORAL
Qty: 180 TABLET | Refills: 1 | Status: SHIPPED | OUTPATIENT
Start: 2025-06-27

## 2025-06-27 NOTE — TELEPHONE ENCOUNTER
Caller: Sanket Shultz    Relationship: Emergency Contact    Best call back number: [phone]     Requested Prescriptions:   Requested Prescriptions     Pending Prescriptions Disp Refills    metFORMIN ER (GLUCOPHAGE-XR) 500 MG 24 hr tablet 180 tablet 1     Sig: Take 2 tablets by mouth Daily With Breakfast.        Pharmacy where request should be sent: [facility] [address] [place] [address] [facility]  - [phone]  - [phone]     Last office visit with prescribing clinician: 2/25/2025   Last telemedicine visit with prescribing clinician: Visit date not found   Next office visit with prescribing clinician: 8/27/2025     Additional details provided by patient: PATIENT IS REQUESTING A NEW PRESCRIPTION ON ABOVE MEDICATION.     Does the patient have less than a 3 day supply:  [x] Yes  [] No    Would you like a call back once the refill request has been completed: [] Yes [x] No    If the office needs to give you a call back, can they leave a voicemail: [] Yes [x] No    Bridger Durand   06/27/25 08:51 EDT

## 2025-07-21 ENCOUNTER — OFFICE VISIT (OUTPATIENT)
Dept: FAMILY MEDICINE CLINIC | Facility: CLINIC | Age: 55
End: 2025-07-21
Payer: MEDICAID

## 2025-07-21 VITALS
WEIGHT: 163.2 LBS | TEMPERATURE: 98.2 F | RESPIRATION RATE: 14 BRPM | DIASTOLIC BLOOD PRESSURE: 70 MMHG | BODY MASS INDEX: 25.62 KG/M2 | HEART RATE: 71 BPM | OXYGEN SATURATION: 99 % | HEIGHT: 67 IN | SYSTOLIC BLOOD PRESSURE: 112 MMHG

## 2025-07-21 DIAGNOSIS — S61.214A LACERATION OF RIGHT RING FINGER, FOREIGN BODY PRESENCE UNSPECIFIED, NAIL DAMAGE STATUS UNSPECIFIED, INITIAL ENCOUNTER: Primary | ICD-10-CM

## 2025-07-21 PROCEDURE — 1160F RVW MEDS BY RX/DR IN RCRD: CPT | Performed by: NURSE PRACTITIONER

## 2025-07-21 PROCEDURE — 1126F AMNT PAIN NOTED NONE PRSNT: CPT | Performed by: NURSE PRACTITIONER

## 2025-07-21 PROCEDURE — 99213 OFFICE O/P EST LOW 20 MIN: CPT | Performed by: NURSE PRACTITIONER

## 2025-07-21 PROCEDURE — 1159F MED LIST DOCD IN RCRD: CPT | Performed by: NURSE PRACTITIONER

## 2025-07-21 PROCEDURE — 3051F HG A1C>EQUAL 7.0%<8.0%: CPT | Performed by: NURSE PRACTITIONER

## 2025-07-21 RX ORDER — SULFAMETHOXAZOLE AND TRIMETHOPRIM 800; 160 MG/1; MG/1
1 TABLET ORAL 2 TIMES DAILY
Qty: 20 TABLET | Refills: 0 | Status: SHIPPED | OUTPATIENT
Start: 2025-07-21 | End: 2025-07-31

## 2025-07-21 RX ORDER — MUPIROCIN 2 %
1 OINTMENT (GRAM) TOPICAL 3 TIMES DAILY
Qty: 30 G | Refills: 0 | Status: SHIPPED | OUTPATIENT
Start: 2025-07-21

## 2025-07-21 NOTE — PROGRESS NOTES
Date: 2025   Patient Name: Dragan Shultz  : 1970   MRN: [id number]     Chief Complaint:    Chief Complaint   Patient presents with    Finger Laceration     2 weeks ago when he was throwing out a broken up old toilet.        History of Present Illness: Dragan Shultz is a 55 y.o. male who is here today to follow up for HPI     History of Present Illness  The patient presents for a laceration on his finger.    While disposing of a broken toilet, he accidentally cut his finger. He immediately applied rubbing alcohol to the wound due to its exposure to the toilet. The wound was then treated with Neosporin and bandaged for several weeks. Initially, the wound appeared to be healing well, but on Saturday, it began to show signs of inflammation. There is no warmth in the area, but there is noticeable swelling. He experienced a throbbing sensation in the wound on Saturday, which raised concerns about a possible infection. He is planning to travel on Wednesday and will be away for 5 days. He does not perform any tasks that involve submerging his hands in water, such as dishwashing. 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    Occupations:      Review of Systems:   Review of Systems   Skin:  Positive for wound.       I have reviewed the patients family history, social history, past medical history, past surgical history and have updated it as appropriate.     Medications:     Current Outpatient Medications:     Blood Glucose Monitoring Suppl device, Use 1 each Daily., Disp: 1 each, Rfl: 0    Fluticasone-Salmeterol (Advair Diskus) 250-50 MCG/ACT DISKUS, Inhale 1 puff 2 (Two) Times a Day., Disp: 60 each, Rfl: 11    glucose blood test strip, Use as instructed to check blood sugar once daily, Disp: 90 each, Rfl: 3    metFORMIN ER (GLUCOPHAGE-XR) 500 MG 24 hr tablet, Take 2 tablets by mouth Daily With Breakfast., Disp: 180 tablet, Rfl: 1    OneTouch  "Delica Lancets 33G misc, Use 1 each Daily. To check blood sugar, Disp: 100 each, Rfl: 3    rosuvastatin (Crestor) 10 MG tablet, Take 1 tablet by mouth Daily., Disp: 90 tablet, Rfl: 3    mupirocin (BACTROBAN) 2 % ointment, Apply 1 Application topically to the appropriate area as directed 3 (Three) Times a Day., Disp: 30 g, Rfl: 0    sulfamethoxazole-trimethoprim (Bactrim DS) 800-160 MG per tablet, Take 1 tablet by mouth 2 (Two) Times a Day for 10 days., Disp: 20 tablet, Rfl: 0    Allergies:   No Known Allergies    PHQ-9 Total Score:      Physical Exam:  Vital Signs:   Vitals:    07/21/25 1023   BP: 112/70   Pulse: 71   Resp: 14   Temp: 98.2 °F (36.8 °C)   SpO2: 99%   Weight: 74 kg (163 lb 3.2 oz)   Height: 170.2 cm (67\")     Body mass index is 25.56 kg/m².           Physical Exam  Vitals and nursing note reviewed.   Constitutional:       Appearance: Normal appearance.   HENT:      Head: Normocephalic and atraumatic.   Cardiovascular:      Rate and Rhythm: Normal rate and regular rhythm.   Pulmonary:      Effort: Pulmonary effort is normal.      Breath sounds: Normal breath sounds.   Skin:     General: Skin is warm.   Neurological:      Mental Status: He is alert and oriented to person, place, and time.           Assessment/Plan:   Diagnoses and all orders for this visit:    1. Laceration of right ring finger, foreign body presence unspecified, nail damage status unspecified, initial encounter (Primary)  -     sulfamethoxazole-trimethoprim (Bactrim DS) 800-160 MG per tablet; Take 1 tablet by mouth 2 (Two) Times a Day for 10 days.  Dispense: 20 tablet; Refill: 0  -     mupirocin (BACTROBAN) 2 % ointment; Apply 1 Application topically to the appropriate area as directed 3 (Three) Times a Day.  Dispense: 30 g; Refill: 0         Assessment & Plan  1. Finger laceration.  - The patient reports a finger laceration sustained while handling broken toilet pieces, with subsequent application of rubbing alcohol and Neosporin.  - " Physical examination reveals swelling and raised area without warmth, indicating possible infection.  - Discussed the need for antibiotic treatment to prevent infection and reduce swelling; no need for incision and drainage at this time.  - Prescribed Bactrim, 1 tablet in the morning and 1 at night for 10 days, and a topical cream for application. Advised to clean the wound with antibacterial soap and avoid submerging hands in water.    Patient or patient representative verbalized consent for the use of Ambient Listening during the visit with  MARGI Currie for chart documentation. 7/21/2025  13:43 EDT      Follow Up:   Return if symptoms worsen or fail to improve.      Janie Moon. MARGI   [facility]

## 2025-07-21 NOTE — PROGRESS NOTES
Date: 2025   Patient Name: Dragan Shultz  : 1970   MRN: [id number]     Chief Complaint:    Chief Complaint   Patient presents with    Finger Laceration     2 weeks ago when he was throwing out a broken up old toilet.        History of Present Illness: Dragan Shultz is a 55 y.o. male who is here today to follow up for Finger Laceration       History of Present Illness  The patient presents for a laceration on his finger.    While disposing of a broken toilet, he accidentally cut his finger. He immediately applied rubbing alcohol to the wound due to its exposure to the toilet. The wound was then treated with Neosporin and bandaged for several weeks. Initially, the wound appeared to be healing well, but on Saturday, it began to show signs of inflammation. There is no warmth in the area, but there is noticeable swelling. He experienced a throbbing sensation in the wound on Saturday, which raised concerns about a possible infection. He is planning to travel on Wednesday and will be away for 5 days. He does not perform any tasks that involve submerging his hands in water, such as dishwashing. 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    Occupations:      Review of Systems:   Review of Systems   Skin:  Positive for wound.       I have reviewed the patients family history, social history, past medical history, past surgical history and have updated it as appropriate.     Medications:     Current Outpatient Medications:     Blood Glucose Monitoring Suppl device, Use 1 each Daily., Disp: 1 each, Rfl: 0    Fluticasone-Salmeterol (Advair Diskus) 250-50 MCG/ACT DISKUS, Inhale 1 puff 2 (Two) Times a Day., Disp: 60 each, Rfl: 11    glucose blood test strip, Use as instructed to check blood sugar once daily, Disp: 90 each, Rfl: 3    metFORMIN ER (GLUCOPHAGE-XR) 500 MG 24 hr tablet, Take 2 tablets by mouth Daily With Breakfast., Disp: 180 tablet, Rfl:  "1    OneTouch Delica Lancets 33G misc, Use 1 each Daily. To check blood sugar, Disp: 100 each, Rfl: 3    rosuvastatin (Crestor) 10 MG tablet, Take 1 tablet by mouth Daily., Disp: 90 tablet, Rfl: 3    mupirocin (BACTROBAN) 2 % ointment, Apply 1 Application topically to the appropriate area as directed 3 (Three) Times a Day., Disp: 30 g, Rfl: 0    sulfamethoxazole-trimethoprim (Bactrim DS) 800-160 MG per tablet, Take 1 tablet by mouth 2 (Two) Times a Day for 10 days., Disp: 20 tablet, Rfl: 0    Allergies:   No Known Allergies    PHQ-9 Total Score:      Physical Exam:  Vital Signs:   Vitals:    07/21/25 1023   BP: 112/70   Pulse: 71   Resp: 14   Temp: 98.2 °F (36.8 °C)   SpO2: 99%   Weight: 74 kg (163 lb 3.2 oz)   Height: 170.2 cm (67\")     Body mass index is 25.56 kg/m².           Physical Exam  Vitals and nursing note reviewed.   Constitutional:       Appearance: Normal appearance.   HENT:      Head: Normocephalic and atraumatic.   Cardiovascular:      Rate and Rhythm: Normal rate and regular rhythm.   Pulmonary:      Effort: Pulmonary effort is normal.      Breath sounds: Normal breath sounds.   Skin:     General: Skin is warm.      Comments: See photo   Neurological:      Mental Status: He is alert and oriented to person, place, and time.           Assessment/Plan:   Diagnoses and all orders for this visit:    1. Laceration of right ring finger, foreign body presence unspecified, nail damage status unspecified, initial encounter (Primary)  -     sulfamethoxazole-trimethoprim (Bactrim DS) 800-160 MG per tablet; Take 1 tablet by mouth 2 (Two) Times a Day for 10 days.  Dispense: 20 tablet; Refill: 0  -     mupirocin (BACTROBAN) 2 % ointment; Apply 1 Application topically to the appropriate area as directed 3 (Three) Times a Day.  Dispense: 30 g; Refill: 0         Assessment & Plan  1. Finger laceration.  - The patient reports a finger laceration sustained while handling broken toilet pieces, with subsequent " application of rubbing alcohol and Neosporin.  - Physical examination reveals swelling and raised area without warmth, indicating possible infection.  - Discussed the need for antibiotic treatment to prevent infection and reduce swelling; no need for incision and drainage at this time.  - Prescribed Bactrim, 1 tablet in the morning and 1 at night for 10 days, and a topical cream for application. Advised to clean the wound with antibacterial soap and avoid submerging hands in water.    Patient or patient representative verbalized consent for the use of Ambient Listening during the visit with  MARGI Currie for chart documentation. 7/21/2025  10:39 EDT      Follow Up:   Return if symptoms worsen or fail to improve.      Janie Moon. MARGI   [facility]

## 2025-08-27 ENCOUNTER — OFFICE VISIT (OUTPATIENT)
Dept: FAMILY MEDICINE CLINIC | Facility: CLINIC | Age: 55
End: 2025-08-27
Payer: MEDICAID

## 2025-08-27 VITALS
SYSTOLIC BLOOD PRESSURE: 102 MMHG | WEIGHT: 162 LBS | DIASTOLIC BLOOD PRESSURE: 62 MMHG | TEMPERATURE: 97 F | HEART RATE: 61 BPM | OXYGEN SATURATION: 96 % | BODY MASS INDEX: 25.43 KG/M2 | HEIGHT: 67 IN

## 2025-08-27 DIAGNOSIS — E11.9 TYPE 2 DIABETES MELLITUS WITHOUT COMPLICATION, WITHOUT LONG-TERM CURRENT USE OF INSULIN: Primary | ICD-10-CM

## 2025-08-27 DIAGNOSIS — Z23 NEED FOR TETANUS BOOSTER: ICD-10-CM

## 2025-08-27 DIAGNOSIS — E78.00 HYPERCHOLESTEROLEMIA: ICD-10-CM

## 2025-08-27 DIAGNOSIS — J45.20 MILD INTERMITTENT ASTHMA WITHOUT COMPLICATION: ICD-10-CM

## 2025-08-27 DIAGNOSIS — E55.9 VITAMIN D INSUFFICIENCY: ICD-10-CM

## 2025-08-27 LAB
EXPIRATION DATE: ABNORMAL
HBA1C MFR BLD: 7 % (ref 4.5–5.7)
Lab: ABNORMAL

## 2025-08-27 RX ORDER — ALBUTEROL SULFATE 90 UG/1
2 INHALANT RESPIRATORY (INHALATION) EVERY 4 HOURS PRN
Qty: 8 G | Refills: 5 | Status: SHIPPED | OUTPATIENT
Start: 2025-08-27

## 2025-08-28 LAB
25(OH)D3+25(OH)D2 SERPL-MCNC: 27.8 NG/ML (ref 30–100)
ALBUMIN SERPL-MCNC: 4.5 G/DL (ref 3.5–5.2)
ALBUMIN/GLOB SERPL: 1.8 G/DL
ALP SERPL-CCNC: 44 U/L (ref 39–117)
ALT SERPL-CCNC: 13 U/L (ref 1–41)
AST SERPL-CCNC: 16 U/L (ref 1–40)
BASOPHILS # BLD AUTO: 0.05 10*3/MM3 (ref 0–0.2)
BASOPHILS NFR BLD AUTO: 1 % (ref 0–1.5)
BILIRUB SERPL-MCNC: 0.4 MG/DL (ref 0–1.2)
BUN SERPL-MCNC: 9 MG/DL (ref 6–20)
BUN/CREAT SERPL: 10.5 (ref 7–25)
CALCIUM SERPL-MCNC: 9.3 MG/DL (ref 8.6–10.5)
CHLORIDE SERPL-SCNC: 103 MMOL/L (ref 98–107)
CHOLEST SERPL-MCNC: 178 MG/DL (ref 0–200)
CO2 SERPL-SCNC: 24.9 MMOL/L (ref 22–29)
CREAT SERPL-MCNC: 0.86 MG/DL (ref 0.76–1.27)
EGFRCR SERPLBLD CKD-EPI 2021: 102.3 ML/MIN/1.73
EOSINOPHIL # BLD AUTO: 0.4 10*3/MM3 (ref 0–0.4)
EOSINOPHIL NFR BLD AUTO: 7.8 % (ref 0.3–6.2)
ERYTHROCYTE [DISTWIDTH] IN BLOOD BY AUTOMATED COUNT: 13 % (ref 12.3–15.4)
GLOBULIN SER CALC-MCNC: 2.5 GM/DL
GLUCOSE SERPL-MCNC: 125 MG/DL (ref 65–99)
HCT VFR BLD AUTO: 48.3 % (ref 37.5–51)
HDLC SERPL-MCNC: 53 MG/DL (ref 40–60)
HGB BLD-MCNC: 15.1 G/DL (ref 13–17.7)
IMM GRANULOCYTES # BLD AUTO: 0.02 10*3/MM3 (ref 0–0.05)
IMM GRANULOCYTES NFR BLD AUTO: 0.4 % (ref 0–0.5)
LDLC SERPL CALC-MCNC: 101 MG/DL (ref 0–100)
LYMPHOCYTES # BLD AUTO: 1.76 10*3/MM3 (ref 0.7–3.1)
LYMPHOCYTES NFR BLD AUTO: 34.2 % (ref 19.6–45.3)
MCH RBC QN AUTO: 27.4 PG (ref 26.6–33)
MCHC RBC AUTO-ENTMCNC: 31.3 G/DL (ref 31.5–35.7)
MCV RBC AUTO: 87.5 FL (ref 79–97)
MONOCYTES # BLD AUTO: 0.6 10*3/MM3 (ref 0.1–0.9)
MONOCYTES NFR BLD AUTO: 11.7 % (ref 5–12)
NEUTROPHILS # BLD AUTO: 2.31 10*3/MM3 (ref 1.7–7)
NEUTROPHILS NFR BLD AUTO: 44.9 % (ref 42.7–76)
NRBC BLD AUTO-RTO: 0 /100 WBC (ref 0–0.2)
PLATELET # BLD AUTO: 197 10*3/MM3 (ref 140–450)
POTASSIUM SERPL-SCNC: 4.3 MMOL/L (ref 3.5–5.2)
PROT SERPL-MCNC: 7 G/DL (ref 6–8.5)
RBC # BLD AUTO: 5.52 10*6/MM3 (ref 4.14–5.8)
SODIUM SERPL-SCNC: 140 MMOL/L (ref 136–145)
TRIGL SERPL-MCNC: 135 MG/DL (ref 0–150)
VLDLC SERPL CALC-MCNC: 24 MG/DL (ref 5–40)
WBC # BLD AUTO: 5.14 10*3/MM3 (ref 3.4–10.8)